# Patient Record
Sex: MALE | Race: ASIAN | HISPANIC OR LATINO | Employment: FULL TIME | URBAN - METROPOLITAN AREA
[De-identification: names, ages, dates, MRNs, and addresses within clinical notes are randomized per-mention and may not be internally consistent; named-entity substitution may affect disease eponyms.]

---

## 2017-04-27 ENCOUNTER — OFFICE VISIT (OUTPATIENT)
Dept: URGENT CARE | Facility: CLINIC | Age: 37
End: 2017-04-27

## 2017-04-27 ENCOUNTER — NON-PROVIDER VISIT (OUTPATIENT)
Dept: URGENT CARE | Facility: CLINIC | Age: 37
End: 2017-04-27

## 2017-04-27 DIAGNOSIS — Z01.89 RESPIRATORY CLEARANCE EXAMINATION, ENCOUNTER FOR: ICD-10-CM

## 2017-04-27 DIAGNOSIS — Z29.89 NEED FOR ISOLATION: ICD-10-CM

## 2017-04-27 PROCEDURE — 94010 BREATHING CAPACITY TEST: CPT | Performed by: PHYSICIAN ASSISTANT

## 2017-04-27 PROCEDURE — 94375 RESPIRATORY FLOW VOLUME LOOP: CPT | Performed by: PHYSICIAN ASSISTANT

## 2017-04-27 NOTE — PROGRESS NOTES
Quinn Rollins is a 36 y.o. male here for a non-provider visit for Mask Fit/ Respiratory Clearance/ Spirometry    If abnormal was an in office provider notified today (if so, indicate provider)? Yes  Routed to PCP? No

## 2017-04-27 NOTE — MR AVS SNAPSHOT
Quinn Rollins   2017 9:15 AM   Appointment   MRN: [id number]    Department:  [facility]   Dept Phone:  [phone]    Description:  Male : 1980   Provider:  [facility]           Allergies as of 2017     No Known Allergies      Vital Signs     Smoking Status                   Current Every Day Smoker           Basic Information     Date Of Birth Sex Race Ethnicity Preferred Language    1980 Male Unknown Unknown English      Problem List              ICD-10-CM Priority Class Noted - Resolved    Health examination of defined subpopulation Z02.89   10/4/2012 - Present      Health Maintenance        Date Due Completion Dates    IMM DTaP/Tdap/Td Vaccine (1 - Tdap) 10/11/1999 ---            Current Immunizations     No immunizations on file.      Below and/or attached are the medications your provider expects you to take. Review all of your home medications and newly ordered medications with your provider and/or pharmacist. Follow medication instructions as directed by your provider and/or pharmacist. Please keep your medication list with you and share with your provider. Update the information when medications are discontinued, doses are changed, or new medications (including over-the-counter products) are added; and carry medication information at all times in the event of emergency situations     Allergies:  No Known Allergies          Medications  Valid as of: 2017 - 11:01 AM    Generic Name Brand Name Tablet Size Instructions for use    .                 Medicines prescribed today were sent to:     None      Medication refill instructions:       If your prescription bottle indicates you have medication refills left, it is not necessary to call your provider’s office. Please contact your pharmacy and they will refill your medication.    If your prescription bottle indicates you do not have any refills left, you may request refills at any time through one of the  following ways: The online Viv[employer]imo system (except Urgent Care), by calling your provider’s office, or by asking your pharmacy to contact your provider’s office with a refill request. Medication refills are processed only during regular business hours and may not be available until the next business day. Your provider may request additional information or to have a follow-up visit with you prior to refilling your medication.   *Please Note: Medication refills are assigned a new Rx number when refilled electronically. Your pharmacy may indicate that no refills were authorized even though a new prescription for the same medication is available at the pharmacy. Please request the medicine by name with the pharmacy before contacting your provider for a refill.           Vivisimo Access Code: [email or web address]  Expires: 5/27/2017 11:01 AM    Vivisimo  A secure, online tool to manage your health information     [employer] Vivisimo® is a secure, online tool that connects you to your personalized health information from the privacy of your home -- day or night - making it very easy for you to manage your healthcare. Once the activation process is completed, you can even access your medical information using the Vivisimo crystal, which is available for free in the Apple Crystal store or Google Play store.     Vivisimo provides the following levels of access (as shown below):   My Chart Features   [facility]  Urgent  Care [facility]  [facility] Care  Doctor   Email your healthcare team securely and privately 24/7 X X X    Manage appointments: schedule your next appointment; view details of past/upcoming appointments X      Request prescription refills. X      View recent personal medical records, including lab and immunizations X X X X   View health record, including health history, allergies, medications X X X X   Read reports about your outpatient visits, procedures, consult and ER notes X X X  X   See your discharge summary, which is a recap of your hospital and/or ER visit that includes your diagnosis, lab results, and care plan. X X       How to register for "[employer]:  1. Go to  [email or web address].  2. Click on the Sign Up Now box, which takes you to the New Member Sign Up page. You will need to provide the following information:  a. Enter your "[employer] Access Code exactly as it appears at the top of this page. (You will not need to use this code after you’ve completed the sign-up process. If you do not sign up before the expiration date, you must request a new code.)   b. Enter your date of birth.   c. Enter your home email address.   d. Click Submit, and follow the next screen’s instructions.  3. Create a [facility] ID. This will be your [facility] login ID and cannot be changed, so think of one that is secure and easy to remember.  4. Create a [facility] password. You can change your password at any time.  5. Enter your Password Reset Question and Answer. This can be used at a later time if you forget your password.   6. Enter your e-mail address. This allows you to receive e-mail notifications when new information is available in "[employer].  7. Click Sign Up. You can now view your health information.    For assistance activating your "[employer] account, call [phone]

## 2018-04-27 ENCOUNTER — NON-PROVIDER VISIT (OUTPATIENT)
Dept: OCCUPATIONAL MEDICINE | Facility: CLINIC | Age: 38
End: 2018-04-27

## 2018-04-27 DIAGNOSIS — Z02.1 PRE-EMPLOYMENT DRUG SCREENING: ICD-10-CM

## 2018-04-27 LAB
AMP AMPHETAMINE: NORMAL
COC COCAINE: NORMAL
INT CON NEG: NORMAL
INT CON POS: NORMAL
MET METHAMPHETAMINES: NORMAL
OPI OPIATES: NORMAL
PCP PHENCYCLIDINE: NORMAL
POC DRUG COMMENT 753798-OCCUPATIONAL HEALTH: NORMAL
THC: NORMAL

## 2018-04-27 PROCEDURE — 80305 DRUG TEST PRSMV DIR OPT OBS: CPT | Performed by: PREVENTIVE MEDICINE

## 2018-10-09 ENCOUNTER — HOSPITAL ENCOUNTER (OUTPATIENT)
Facility: MEDICAL CENTER | Age: 38
End: 2018-10-09
Attending: PHYSICIAN ASSISTANT
Payer: COMMERCIAL

## 2018-10-09 ENCOUNTER — OFFICE VISIT (OUTPATIENT)
Dept: URGENT CARE | Facility: CLINIC | Age: 38
End: 2018-10-09

## 2018-10-09 VITALS
HEIGHT: 66 IN | SYSTOLIC BLOOD PRESSURE: 128 MMHG | BODY MASS INDEX: 30.31 KG/M2 | TEMPERATURE: 98.7 F | OXYGEN SATURATION: 98 % | HEART RATE: 78 BPM | WEIGHT: 188.6 LBS | RESPIRATION RATE: 14 BRPM | DIASTOLIC BLOOD PRESSURE: 86 MMHG

## 2018-10-09 DIAGNOSIS — Z02.89 ENCOUNTER FOR OCCUPATIONAL HEALTH ASSESSMENT: ICD-10-CM

## 2018-10-09 DIAGNOSIS — Z02.1 PRE-EMPLOYMENT DRUG SCREENING: ICD-10-CM

## 2018-10-09 LAB
AMP AMPHETAMINE: NORMAL
APPEARANCE UR: CLEAR
BACTERIA #/AREA URNS HPF: ABNORMAL /HPF
BILIRUB UR QL STRIP.AUTO: NEGATIVE
COC COCAINE: NORMAL
COLOR UR: YELLOW
GLUCOSE UR STRIP.AUTO-MCNC: NEGATIVE MG/DL
INT CON NEG: NORMAL
INT CON POS: NORMAL
KETONES UR STRIP.AUTO-MCNC: NEGATIVE MG/DL
LEUKOCYTE ESTERASE UR QL STRIP.AUTO: NEGATIVE
MET METHAMPHETAMINES: NORMAL
MICRO URNS: NORMAL
NITRITE UR QL STRIP.AUTO: NEGATIVE
OPI OPIATES: NORMAL
PCP PHENCYCLIDINE: NORMAL
PH UR STRIP.AUTO: 6.5 [PH]
POC DRUG COMMENT 753798-OCCUPATIONAL HEALTH: NEGATIVE
PROT UR QL STRIP: NEGATIVE MG/DL
RBC UR QL AUTO: NEGATIVE
SP GR UR STRIP.AUTO: 1.01
THC: NORMAL
UROBILINOGEN UR STRIP.AUTO-MCNC: 0.2 MG/DL

## 2018-10-09 PROCEDURE — 8915 PR COMPREHENSIVE PHYSICAL: Performed by: PHYSICIAN ASSISTANT

## 2018-10-09 PROCEDURE — 80305 DRUG TEST PRSMV DIR OPT OBS: CPT | Performed by: PHYSICIAN ASSISTANT

## 2018-10-09 PROCEDURE — 94010 BREATHING CAPACITY TEST: CPT | Performed by: PHYSICIAN ASSISTANT

## 2018-10-10 LAB
ALBUMIN SERPL BCP-MCNC: 4.3 G/DL (ref 3.2–4.9)
ALBUMIN/GLOB SERPL: 1.3 G/DL
ALP SERPL-CCNC: 68 U/L (ref 30–99)
ALT SERPL-CCNC: 46 U/L (ref 2–50)
ANION GAP SERPL CALC-SCNC: 8 MMOL/L (ref 0–11.9)
AST SERPL-CCNC: 30 U/L (ref 12–45)
BASOPHILS # BLD AUTO: 1.4 % (ref 0–1.8)
BASOPHILS # BLD: 0.09 K/UL (ref 0–0.12)
BILIRUB SERPL-MCNC: 0.4 MG/DL (ref 0.1–1.5)
BUN SERPL-MCNC: 8 MG/DL (ref 8–22)
CALCIUM SERPL-MCNC: 9.4 MG/DL (ref 8.5–10.5)
CHLORIDE SERPL-SCNC: 102 MMOL/L (ref 96–112)
CO2 SERPL-SCNC: 29 MMOL/L (ref 20–33)
CREAT SERPL-MCNC: 1.46 MG/DL (ref 0.5–1.4)
EOSINOPHIL # BLD AUTO: 0.65 K/UL (ref 0–0.51)
EOSINOPHIL NFR BLD: 9.8 % (ref 0–6.9)
ERYTHROCYTE [DISTWIDTH] IN BLOOD BY AUTOMATED COUNT: 43.2 FL (ref 35.9–50)
GLOBULIN SER CALC-MCNC: 3.2 G/DL (ref 1.9–3.5)
GLUCOSE SERPL-MCNC: 81 MG/DL (ref 65–99)
HCT VFR BLD AUTO: 56 % (ref 42–52)
HGB BLD-MCNC: 18.5 G/DL (ref 14–18)
IMM GRANULOCYTES # BLD AUTO: 0.02 K/UL (ref 0–0.11)
IMM GRANULOCYTES NFR BLD AUTO: 0.3 % (ref 0–0.9)
LYMPHOCYTES # BLD AUTO: 2.13 K/UL (ref 1–4.8)
LYMPHOCYTES NFR BLD: 32 % (ref 22–41)
MCH RBC QN AUTO: 31.3 PG (ref 27–33)
MCHC RBC AUTO-ENTMCNC: 33 G/DL (ref 33.7–35.3)
MCV RBC AUTO: 94.6 FL (ref 81.4–97.8)
MONOCYTES # BLD AUTO: 0.45 K/UL (ref 0–0.85)
MONOCYTES NFR BLD AUTO: 6.8 % (ref 0–13.4)
NEUTROPHILS # BLD AUTO: 3.32 K/UL (ref 1.82–7.42)
NEUTROPHILS NFR BLD: 49.7 % (ref 44–72)
NRBC # BLD AUTO: 0 K/UL
NRBC BLD-RTO: 0 /100 WBC
PLATELET # BLD AUTO: 215 K/UL (ref 164–446)
PMV BLD AUTO: 9.6 FL (ref 9–12.9)
POTASSIUM SERPL-SCNC: 4.2 MMOL/L (ref 3.6–5.5)
PROT SERPL-MCNC: 7.5 G/DL (ref 6–8.2)
RBC # BLD AUTO: 5.92 M/UL (ref 4.7–6.1)
SODIUM SERPL-SCNC: 139 MMOL/L (ref 135–145)
WBC # BLD AUTO: 6.7 K/UL (ref 4.8–10.8)

## 2018-10-13 LAB
LEAD BLD-MCNC: <2 UG/DL (ref 0–4.9)
ZPP RBC-MCNC: 28 UG/DL (ref 0–40)
ZPP RBC-SRTO: 48 UMOLZPP/MOLHEM (ref 0–69)

## 2020-10-30 ENCOUNTER — OFFICE VISIT (OUTPATIENT)
Dept: URGENT CARE | Facility: PHYSICIAN GROUP | Age: 40
End: 2020-10-30
Payer: COMMERCIAL

## 2020-10-30 ENCOUNTER — HOSPITAL ENCOUNTER (OUTPATIENT)
Facility: MEDICAL CENTER | Age: 40
End: 2020-10-30
Attending: FAMILY MEDICINE
Payer: COMMERCIAL

## 2020-10-30 VITALS
HEART RATE: 81 BPM | WEIGHT: 183 LBS | OXYGEN SATURATION: 98 % | BODY MASS INDEX: 29.41 KG/M2 | TEMPERATURE: 98.2 F | RESPIRATION RATE: 15 BRPM | SYSTOLIC BLOOD PRESSURE: 122 MMHG | DIASTOLIC BLOOD PRESSURE: 82 MMHG | HEIGHT: 66 IN

## 2020-10-30 DIAGNOSIS — Z20.822 CLOSE EXPOSURE TO COVID-19 VIRUS: ICD-10-CM

## 2020-10-30 DIAGNOSIS — G44.209 TENSION-TYPE HEADACHE, NOT INTRACTABLE, UNSPECIFIED CHRONICITY PATTERN: ICD-10-CM

## 2020-10-30 LAB — COVID ORDER STATUS COVID19: NORMAL

## 2020-10-30 PROCEDURE — U0003 INFECTIOUS AGENT DETECTION BY NUCLEIC ACID (DNA OR RNA); SEVERE ACUTE RESPIRATORY SYNDROME CORONAVIRUS 2 (SARS-COV-2) (CORONAVIRUS DISEASE [COVID-19]), AMPLIFIED PROBE TECHNIQUE, MAKING USE OF HIGH THROUGHPUT TECHNOLOGIES AS DESCRIBED BY CMS-2020-01-R: HCPCS

## 2020-10-30 PROCEDURE — 99214 OFFICE O/P EST MOD 30 MIN: CPT | Performed by: FAMILY MEDICINE

## 2020-10-30 RX ORDER — IBUPROFEN 800 MG/1
800 TABLET ORAL EVERY 8 HOURS PRN
Qty: 30 TAB | Refills: 0 | Status: SHIPPED | OUTPATIENT
Start: 2020-10-30 | End: 2023-06-29

## 2020-10-30 RX ORDER — ACETAMINOPHEN 325 MG/1
650 TABLET ORAL EVERY 4 HOURS PRN
COMMUNITY

## 2020-10-30 NOTE — PROGRESS NOTES
"Chief Complaint   Patient presents with   • Headache     COVID exposure         headache  This is a new problem. The current episode started in the past 2 days. The problem occurs intermittently. The problem has been gradually improved.   C/o dull, achy pain over forehead.  The pain is mild.     Nothing seems to make the headache worse.     Patient has tried acetaminophen for the symptoms. The treatment provided no relief.          **POSITIVE EXPOSURE TO COVID AT WORK    Social History     Tobacco Use   • Smoking status: Current Every Day Smoker     Packs/day: 1.00     Years: 5.00     Pack years: 5.00     Types: Cigarettes   • Smokeless tobacco: Never Used   Substance Use Topics   • Alcohol use: Not on file   • Drug use: No            Past medical history was unremarkable and not pertinent to current issue      Review of Systems   Constitutional: Negative for fever, chills and malaise/fatigue.   Eyes: Negative for vision changes, d/c.   negative for photophobia.   Respiratory: Negative for cough and sputum production.    Cardiovascular: Negative for chest pain and palpitations.   Gastrointestinal: Negative for nausea, vomiting, abdominal pain, diarrhea and constipation.   Genitourinary: Negative for dysuria, urgency and frequency.   Skin: Negative for rash or  itching.   Neurological: Negative for dizziness and tingling.   Psychiatric/Behavioral: Negative for depression.   Hematologic/lymphatic - denies bruising or excessive bleeding  All other systems reviewed and are negative.               Objective:     /82   Pulse 81   Temp 36.8 °C (98.2 °F)   Resp 15   Ht 1.676 m (5' 6\")   Wt 83 kg (183 lb)   SpO2 98%     Physical Exam   Constitutional: pt is oriented to person, place, and time.  Pt appears well-developed and well-nourished. No distress.   HENT:   Head: Normocephalic and atraumatic.   Mouth/Throat: Oropharynx is clear and moist. No oropharyngeal exudate.   Eyes: Conjunctivae and EOM are normal. " Pupils are equal, round, and reactive to light. Right eye exhibits no discharge. Left eye exhibits no discharge. No scleral icterus.   Neck: Neck supple.   Cardiovascular: Normal rate and regular rhythm.    Pulmonary/Chest: Effort normal.  CTAB  Lymphadenopathy:     Pt has no cervical adenopathy.   Neurologic: Alert and oriented. Cranial nerves II-XII intact, EOMs intact, no tongue deviation, PERRL, no facial asymmetry to motor or sensation, symmetric palate, normal finger-to-nose test, no pronator drift. No focal motor deficits. Symmetric reflexes. Normal station and gait, normal tandem walk. Coordination normal.   Skin: Skin is warm. Pt is not diaphoretic. No erythema. No pallor.   Psychiatric:  behavior is normal.   Nursing note and vitals reviewed.              Assessment/Plan:        1. Close exposure to COVID-19 virus     Will send screen for COVID  Home isolation for now       - COVID/SARS COV-2 PCR; Future    2. Tension-type headache, not intractable, unspecified chronicity pattern     - ibuprofen (MOTRIN) 800 MG Tab; Take 1 Tab by mouth every 8 hours as needed.  Dispense: 30 Tab; Refill: 0

## 2020-10-31 LAB
SARS-COV-2 RNA RESP QL NAA+PROBE: NOTDETECTED
SPECIMEN SOURCE: NORMAL

## 2020-11-02 ENCOUNTER — TELEPHONE (OUTPATIENT)
Dept: URGENT CARE | Facility: CLINIC | Age: 40
End: 2020-11-02

## 2022-05-21 ENCOUNTER — OFFICE VISIT (OUTPATIENT)
Dept: URGENT CARE | Facility: PHYSICIAN GROUP | Age: 42
End: 2022-05-21
Payer: COMMERCIAL

## 2022-05-21 VITALS
DIASTOLIC BLOOD PRESSURE: 84 MMHG | WEIGHT: 185 LBS | TEMPERATURE: 97 F | BODY MASS INDEX: 29.73 KG/M2 | HEIGHT: 66 IN | OXYGEN SATURATION: 98 % | SYSTOLIC BLOOD PRESSURE: 122 MMHG | HEART RATE: 78 BPM

## 2022-05-21 DIAGNOSIS — H10.33 ACUTE CONJUNCTIVITIS OF BOTH EYES, UNSPECIFIED ACUTE CONJUNCTIVITIS TYPE: ICD-10-CM

## 2022-05-21 PROCEDURE — 99213 OFFICE O/P EST LOW 20 MIN: CPT | Performed by: STUDENT IN AN ORGANIZED HEALTH CARE EDUCATION/TRAINING PROGRAM

## 2022-05-21 RX ORDER — FLUTICASONE PROPIONATE 50 MCG
2 SPRAY, SUSPENSION (ML) NASAL
Qty: 16 G | Refills: 1 | Status: SHIPPED | OUTPATIENT
Start: 2022-05-21

## 2022-05-21 RX ORDER — CETIRIZINE HYDROCHLORIDE 10 MG/1
10 TABLET ORAL DAILY
Qty: 30 TABLET | Refills: 1 | Status: SHIPPED | OUTPATIENT
Start: 2022-05-21

## 2022-05-21 RX ORDER — OLOPATADINE HYDROCHLORIDE 1 MG/ML
1 SOLUTION/ DROPS OPHTHALMIC 2 TIMES DAILY
Qty: 5 ML | Refills: 0 | Status: SHIPPED | OUTPATIENT
Start: 2022-05-21

## 2022-05-21 NOTE — LETTER
May 21, 2022       Patient: Quinn Rollins   YOB: 1980   Date of Visit: 5/21/2022         To Whom It May Concern:    Quinn Rollins is cleared to return to work. He does not have a bacterial infection of his eye.     If you have any questions or concerns, please don't hesitate to call [phone]          Sincerely,          Fernando Larry D.O.  Electronically Signed

## 2022-05-21 NOTE — PROGRESS NOTES
"Subjective:   CHIEF COMPLAINT  Chief Complaint   Patient presents with   • Eye Problem     X this am, Rt. Eye redness, itchy and crusty       [facility]  Quinn Rollins is a 41 y.o. male who presents with a chief complaint of right eye redness and discharge.  Says it woke him up around 130 this morning.  Eye is slightly pruritic.  He is not experiencing any eye pain.  He has not tried taking medications.  No specific modifying factors.  He does not wear contacts.  No changes in vision.  He has a daughter at home with similar symptoms.    REVIEW OF SYSTEMS  General: no fever or chills  GI: no nausea or vomiting  See HPI for further details.    PAST MEDICAL HISTORY  Patient Active Problem List    Diagnosis Date Noted   • Health examination of defined subpopulation 10/04/2012       SURGICAL HISTORY  patient denies any surgical history    ALLERGIES  No Known Allergies    CURRENT MEDICATIONS  Home Medications    **Home medications have not yet been reviewed for this encounter**         SOCIAL HISTORY  Social History     Tobacco Use   • Smoking status: Current Every Day Smoker     Packs/day: 1.00     Years: 5.00     Pack years: 5.00     Types: Cigarettes   • Smokeless tobacco: Never Used   Substance and Sexual Activity   • Alcohol use: Not on file   • Drug use: No   • Sexual activity: Not Currently       FAMILY HISTORY  No family history on file.       Objective:   PHYSICAL EXAM  VITAL SIGNS: /84 (BP Location: Left arm, Patient Position: Sitting, BP Cuff Size: Large adult)   Pulse 78   Temp 36.1 °C (97 °F) (Temporal)   Ht 1.676 m (5' 6\")   Wt 83.9 kg (185 lb)   SpO2 98%   BMI 29.86 kg/m²     Gen: no acute distress, normal voice  Skin: dry, intact, moist mucosal membranes  Eye: EOMI and PERRLA b/l.  Mild right conjunctival injection.  No chemosis.  No pain with extraocular eye movement or swinging flashlight.  Lungs: CTAB w/ symmetric expansion  CV: RRR w/o murmurs or clicks  Psych: normal affect, normal " judgement, alert, awake    Assessment/Plan:     1. Acute conjunctivitis of both eyes, unspecified acute conjunctivitis type  olopatadine (PATANOL) 0.1 % ophthalmic solution    fluticasone (FLONASE) 50 MCG/ACT nasal spray    cetirizine (ZYRTEC) 10 MG Tab   Likely viral given her daughter is experiencing similar symptoms.  Signs and symptoms are not consistent with a bacterial conjunctivitis.  - Ordered Rx for Patanol  - Ordered Rx for Flonase  - Ordered Rx for Zyrtec  - Provided note for work  - Return to urgent care any new/worsening symptoms or further questions or concerns.  Patient understood everything discussed.  All questions were answered.      Differential diagnosis, natural history, supportive care, and indications for immediate follow-up discussed. All questions answered. Patient agrees with the plan of care.    Follow-up as needed if symptoms worsen or fail to improve to PCP, Urgent care or Emergency Room.    Please note that this dictation was created using voice recognition software. I have made a reasonable attempt to correct obvious errors, but I expect that there are errors of grammar and possibly content that I did not discover before finalizing the note.

## 2022-07-18 ENCOUNTER — TELEPHONE (OUTPATIENT)
Dept: SCHEDULING | Facility: IMAGING CENTER | Age: 42
End: 2022-07-18

## 2022-07-29 SDOH — ECONOMIC STABILITY: HOUSING INSECURITY: IN THE LAST 12 MONTHS, HOW MANY PLACES HAVE YOU LIVED?: 1

## 2022-07-29 SDOH — ECONOMIC STABILITY: TRANSPORTATION INSECURITY
IN THE PAST 12 MONTHS, HAS LACK OF RELIABLE TRANSPORTATION KEPT YOU FROM MEDICAL APPOINTMENTS, MEETINGS, WORK OR FROM GETTING THINGS NEEDED FOR DAILY LIVING?: NO

## 2022-07-29 SDOH — ECONOMIC STABILITY: FOOD INSECURITY: WITHIN THE PAST 12 MONTHS, THE FOOD YOU BOUGHT JUST DIDN'T LAST AND YOU DIDN'T HAVE MONEY TO GET MORE.: NEVER TRUE

## 2022-07-29 SDOH — ECONOMIC STABILITY: HOUSING INSECURITY
IN THE LAST 12 MONTHS, WAS THERE A TIME WHEN YOU DID NOT HAVE A STEADY PLACE TO SLEEP OR SLEPT IN A SHELTER (INCLUDING NOW)?: NO

## 2022-07-29 SDOH — ECONOMIC STABILITY: INCOME INSECURITY: IN THE LAST 12 MONTHS, WAS THERE A TIME WHEN YOU WERE NOT ABLE TO PAY THE MORTGAGE OR RENT ON TIME?: YES

## 2022-07-29 SDOH — HEALTH STABILITY: MENTAL HEALTH
STRESS IS WHEN SOMEONE FEELS TENSE, NERVOUS, ANXIOUS, OR CAN'T SLEEP AT NIGHT BECAUSE THEIR MIND IS TROUBLED. HOW STRESSED ARE YOU?: RATHER MUCH

## 2022-07-29 SDOH — HEALTH STABILITY: PHYSICAL HEALTH: ON AVERAGE, HOW MANY DAYS PER WEEK DO YOU ENGAGE IN MODERATE TO STRENUOUS EXERCISE (LIKE A BRISK WALK)?: 3 DAYS

## 2022-07-29 SDOH — ECONOMIC STABILITY: TRANSPORTATION INSECURITY
IN THE PAST 12 MONTHS, HAS THE LACK OF TRANSPORTATION KEPT YOU FROM MEDICAL APPOINTMENTS OR FROM GETTING MEDICATIONS?: NO

## 2022-07-29 SDOH — ECONOMIC STABILITY: FOOD INSECURITY: WITHIN THE PAST 12 MONTHS, YOU WORRIED THAT YOUR FOOD WOULD RUN OUT BEFORE YOU GOT MONEY TO BUY MORE.: NEVER TRUE

## 2022-07-29 SDOH — ECONOMIC STABILITY: HOUSING INSECURITY
IN THE LAST 12 MONTHS, WAS THERE A TIME WHEN YOU DID NOT HAVE A STEADY PLACE TO SLEEP OR SLEPT IN A SHELTER (INCLUDING NOW)?: YES

## 2022-07-29 SDOH — HEALTH STABILITY: PHYSICAL HEALTH: ON AVERAGE, HOW MANY MINUTES DO YOU ENGAGE IN EXERCISE AT THIS LEVEL?: 60 MIN

## 2022-07-29 SDOH — ECONOMIC STABILITY: TRANSPORTATION INSECURITY
IN THE PAST 12 MONTHS, HAS LACK OF TRANSPORTATION KEPT YOU FROM MEETINGS, WORK, OR FROM GETTING THINGS NEEDED FOR DAILY LIVING?: NO

## 2022-07-29 SDOH — ECONOMIC STABILITY: INCOME INSECURITY: HOW HARD IS IT FOR YOU TO PAY FOR THE VERY BASICS LIKE FOOD, HOUSING, MEDICAL CARE, AND HEATING?: NOT HARD AT ALL

## 2022-07-29 ASSESSMENT — LIFESTYLE VARIABLES
HOW OFTEN DO YOU HAVE A DRINK CONTAINING ALCOHOL: MONTHLY OR LESS
HOW OFTEN DO YOU HAVE SIX OR MORE DRINKS ON ONE OCCASION: LESS THAN MONTHLY

## 2022-07-29 ASSESSMENT — SOCIAL DETERMINANTS OF HEALTH (SDOH)
HOW OFTEN DO YOU GET TOGETHER WITH FRIENDS OR RELATIVES?: ONCE A WEEK
HOW OFTEN DO YOU ATTEND CHURCH OR RELIGIOUS SERVICES?: 1 TO 4 TIMES PER YEAR
WITHIN THE PAST 12 MONTHS, YOU WORRIED THAT YOUR FOOD WOULD RUN OUT BEFORE YOU GOT THE MONEY TO BUY MORE: NEVER TRUE
HOW OFTEN DO YOU ATTENT MEETINGS OF THE CLUB OR ORGANIZATION YOU BELONG TO?: NEVER
HOW OFTEN DO YOU HAVE SIX OR MORE DRINKS ON ONE OCCASION: LESS THAN MONTHLY
HOW OFTEN DO YOU ATTENT MEETINGS OF THE CLUB OR ORGANIZATION YOU BELONG TO?: NEVER
IN A TYPICAL WEEK, HOW MANY TIMES DO YOU TALK ON THE PHONE WITH FAMILY, FRIENDS, OR NEIGHBORS?: MORE THAN THREE TIMES A WEEK
IN A TYPICAL WEEK, HOW MANY TIMES DO YOU TALK ON THE PHONE WITH FAMILY, FRIENDS, OR NEIGHBORS?: MORE THAN THREE TIMES A WEEK
DO YOU BELONG TO ANY CLUBS OR ORGANIZATIONS SUCH AS CHURCH GROUPS UNIONS, FRATERNAL OR ATHLETIC GROUPS, OR SCHOOL GROUPS?: NO
HOW OFTEN DO YOU HAVE A DRINK CONTAINING ALCOHOL: MONTHLY OR LESS
HOW OFTEN DO YOU GET TOGETHER WITH FRIENDS OR RELATIVES?: ONCE A WEEK
HOW HARD IS IT FOR YOU TO PAY FOR THE VERY BASICS LIKE FOOD, HOUSING, MEDICAL CARE, AND HEATING?: NOT HARD AT ALL
DO YOU BELONG TO ANY CLUBS OR ORGANIZATIONS SUCH AS CHURCH GROUPS UNIONS, FRATERNAL OR ATHLETIC GROUPS, OR SCHOOL GROUPS?: NO
HOW OFTEN DO YOU ATTEND CHURCH OR RELIGIOUS SERVICES?: 1 TO 4 TIMES PER YEAR

## 2022-08-01 ENCOUNTER — OFFICE VISIT (OUTPATIENT)
Dept: MEDICAL GROUP | Facility: MEDICAL CENTER | Age: 42
End: 2022-08-01
Payer: COMMERCIAL

## 2022-08-01 ENCOUNTER — HOSPITAL ENCOUNTER (OUTPATIENT)
Dept: LAB | Facility: MEDICAL CENTER | Age: 42
End: 2022-08-01
Attending: FAMILY MEDICINE
Payer: COMMERCIAL

## 2022-08-01 VITALS
TEMPERATURE: 97.5 F | SYSTOLIC BLOOD PRESSURE: 118 MMHG | DIASTOLIC BLOOD PRESSURE: 78 MMHG | WEIGHT: 193.6 LBS | OXYGEN SATURATION: 98 % | BODY MASS INDEX: 31.12 KG/M2 | HEIGHT: 66 IN | HEART RATE: 94 BPM

## 2022-08-01 DIAGNOSIS — R51.9 NONINTRACTABLE HEADACHE, UNSPECIFIED CHRONICITY PATTERN, UNSPECIFIED HEADACHE TYPE: ICD-10-CM

## 2022-08-01 DIAGNOSIS — R53.83 FATIGUE, UNSPECIFIED TYPE: ICD-10-CM

## 2022-08-01 DIAGNOSIS — E66.9 OBESITY (BMI 30.0-34.9): ICD-10-CM

## 2022-08-01 LAB
ALBUMIN SERPL BCP-MCNC: 4.5 G/DL (ref 3.2–4.9)
ALBUMIN/GLOB SERPL: 1.5 G/DL
ALP SERPL-CCNC: 73 U/L (ref 30–99)
ALT SERPL-CCNC: 31 U/L (ref 2–50)
ANION GAP SERPL CALC-SCNC: 11 MMOL/L (ref 7–16)
AST SERPL-CCNC: 24 U/L (ref 12–45)
BILIRUB SERPL-MCNC: 0.4 MG/DL (ref 0.1–1.5)
BUN SERPL-MCNC: 11 MG/DL (ref 8–22)
CALCIUM SERPL-MCNC: 9.2 MG/DL (ref 8.5–10.5)
CHLORIDE SERPL-SCNC: 105 MMOL/L (ref 96–112)
CHOLEST SERPL-MCNC: 182 MG/DL (ref 100–199)
CO2 SERPL-SCNC: 25 MMOL/L (ref 20–33)
CREAT SERPL-MCNC: 1.39 MG/DL (ref 0.5–1.4)
ERYTHROCYTE [DISTWIDTH] IN BLOOD BY AUTOMATED COUNT: 44.1 FL (ref 35.9–50)
ERYTHROCYTE [SEDIMENTATION RATE] IN BLOOD BY WESTERGREN METHOD: 4 MM/HOUR (ref 0–20)
GFR SERPLBLD CREATININE-BSD FMLA CKD-EPI: 65 ML/MIN/1.73 M 2
GLOBULIN SER CALC-MCNC: 3.1 G/DL (ref 1.9–3.5)
GLUCOSE SERPL-MCNC: 93 MG/DL (ref 65–99)
HCT VFR BLD AUTO: 56.2 % (ref 42–52)
HDLC SERPL-MCNC: 41 MG/DL
HGB BLD-MCNC: 19 G/DL (ref 14–18)
LDLC SERPL CALC-MCNC: 118 MG/DL
MCH RBC QN AUTO: 31.6 PG (ref 27–33)
MCHC RBC AUTO-ENTMCNC: 33.8 G/DL (ref 33.7–35.3)
MCV RBC AUTO: 93.5 FL (ref 81.4–97.8)
PLATELET # BLD AUTO: 368 K/UL (ref 164–446)
PMV BLD AUTO: 9 FL (ref 9–12.9)
POTASSIUM SERPL-SCNC: 4.7 MMOL/L (ref 3.6–5.5)
PROT SERPL-MCNC: 7.6 G/DL (ref 6–8.2)
RBC # BLD AUTO: 6.01 M/UL (ref 4.7–6.1)
SODIUM SERPL-SCNC: 141 MMOL/L (ref 135–145)
T4 FREE SERPL-MCNC: 1.48 NG/DL (ref 0.93–1.7)
TRIGL SERPL-MCNC: 115 MG/DL (ref 0–149)
TSH SERPL DL<=0.005 MIU/L-ACNC: 0.25 UIU/ML (ref 0.38–5.33)
WBC # BLD AUTO: 5.5 K/UL (ref 4.8–10.8)

## 2022-08-01 PROCEDURE — 84403 ASSAY OF TOTAL TESTOSTERONE: CPT

## 2022-08-01 PROCEDURE — 36415 COLL VENOUS BLD VENIPUNCTURE: CPT

## 2022-08-01 PROCEDURE — 84270 ASSAY OF SEX HORMONE GLOBUL: CPT

## 2022-08-01 PROCEDURE — 85027 COMPLETE CBC AUTOMATED: CPT

## 2022-08-01 PROCEDURE — 99213 OFFICE O/P EST LOW 20 MIN: CPT | Performed by: FAMILY MEDICINE

## 2022-08-01 PROCEDURE — 85652 RBC SED RATE AUTOMATED: CPT

## 2022-08-01 PROCEDURE — 84402 ASSAY OF FREE TESTOSTERONE: CPT

## 2022-08-01 PROCEDURE — 84443 ASSAY THYROID STIM HORMONE: CPT

## 2022-08-01 PROCEDURE — 80053 COMPREHEN METABOLIC PANEL: CPT

## 2022-08-01 PROCEDURE — 84439 ASSAY OF FREE THYROXINE: CPT

## 2022-08-01 PROCEDURE — 80061 LIPID PANEL: CPT

## 2022-08-01 ASSESSMENT — PATIENT HEALTH QUESTIONNAIRE - PHQ9: CLINICAL INTERPRETATION OF PHQ2 SCORE: 0

## 2022-08-01 NOTE — PROGRESS NOTES
"Subjective:     CC:  Diagnoses of Nonintractable headache, unspecified chronicity pattern, unspecified headache type, Fatigue, unspecified type, and Obesity (BMI 30.0-34.9) were pertinent to this visit.    HISTORY OF THE PRESENT ILLNESS: Patient is a 41 y.o. male. This pleasant patient is here today to establish care and discuss feeling tired, headaches, smoking.     Smoking cessation  He has a significant smoking history however he has stopped cigarettes a few months ago and is using a vape pen with known nicotine cartridges at times to help give her this habit.    2-3 weeks ago  Nausea and vomiting for 1 day  Headaches since  No fevers, chills  Does have history of sinus headaches  Its new to have headache to last this long  No congestion, visual changes  No sore throat  Does have back pain in thoracic area  Tylenol and Migraine motrin does seem to help     Notes fatigue and sluggishness recently  Has not had motivation to work out like he use to    No problems updated.    Current Outpatient Medications Ordered in Epic   Medication Sig Dispense Refill   • olopatadine (PATANOL) 0.1 % ophthalmic solution Administer 1 Drop into the right eye 2 times a day. 5 mL 0   • fluticasone (FLONASE) 50 MCG/ACT nasal spray Administer 2 Sprays into affected nostril(S) at bedtime. 16 g 1   • cetirizine (ZYRTEC) 10 MG Tab Take 1 Tablet by mouth every day. 30 Tablet 1   • acetaminophen (TYLENOL) 325 MG Tab Take 650 mg by mouth every four hours as needed.     • ibuprofen (MOTRIN) 800 MG Tab Take 1 Tab by mouth every 8 hours as needed. 30 Tab 0     No current Epic-ordered facility-administered medications on file.       Health Maintenance: Counseled on Tdap    ROS:   ROS see HPI      Objective:       Exam: /78   Pulse 94   Temp 36.4 °C (97.5 °F) (Temporal)   Ht 1.676 m (5' 6\")   Wt 87.8 kg (193 lb 9.6 oz)   SpO2 98%  Body mass index is 31.25 kg/m².    Physical Exam  Vitals reviewed.   Constitutional:       General: He is " not in acute distress.     Appearance: Normal appearance.   HENT:      Head: Normocephalic and atraumatic.      Ears:      Comments: TMs appear mildly bulging without erythema and apparent clear fluid behind tympanic membrane  Cardiovascular:      Rate and Rhythm: Normal rate and regular rhythm.      Heart sounds: Normal heart sounds.   Pulmonary:      Effort: Pulmonary effort is normal.      Breath sounds: Normal breath sounds.   Neurological:      Mental Status: He is alert. Mental status is at baseline.      Cranial Nerves: No cranial nerve deficit.      Coordination: Coordination normal.      Gait: Gait normal.   Psychiatric:         Mood and Affect: Mood normal.         Behavior: Behavior normal.           Assessment & Plan:   41 y.o. male with the following -    Problem List Items Addressed This Visit    None     Visit Diagnoses     Nonintractable headache, unspecified chronicity pattern, unspecified headache type      Patient with no red flag symptoms and no concerning findings on physical exam.  Headache does respond to Tylenol and Motrin.    Here are the supplements I recommend for migraine prophylaxis:  - Riboflavin 400 mg daily  - CoQ-10 100 mg twice daily  - Magnesium citrate 600 mg daily     Given how ears look possible congestion from allergies is causing some of the symptoms and we will try following allergy treatment  Allergy treatment:  Daily allergy medicine (example: Claritin, Zyrtec, Allegra)  Nightly Steroid nose spray (example: Fluticasone, Flonase)    Relevant Orders    CBC WITHOUT DIFFERENTIAL    TSH WITH REFLEX TO FT4    Sed Rate    Fatigue, unspecified type        Relevant Orders    CBC WITHOUT DIFFERENTIAL    Comp Metabolic Panel    TSH WITH REFLEX TO FT4    Testosterone, Free & Total, Adult Male (w/SHBG)    Obesity (BMI 30.0-34.9)      Patient noted to have high muscle mass    Relevant Orders    Comp Metabolic Panel    Lipid Profile            Return in about 4 weeks (around 8/29/2022),  or if symptoms worsen or fail to improve, for Lab F/U, headache follow up.    Please note that this dictation was created using voice recognition software. I have made every reasonable attempt to correct obvious errors, but I expect that there are errors of grammar and possibly content that I did not discover before finalizing the note.

## 2022-08-09 LAB
SHBG SERPL-SCNC: 24 NMOL/L (ref 17–56)
TESTOST FREE MFR SERPL: 2 % (ref 1.6–2.9)
TESTOST FREE SERPL-MCNC: 31 PG/ML (ref 47–244)
TESTOST SERPL-MCNC: 156 NG/DL (ref 300–890)

## 2022-08-23 ENCOUNTER — OFFICE VISIT (OUTPATIENT)
Dept: MEDICAL GROUP | Facility: MEDICAL CENTER | Age: 42
End: 2022-08-23
Payer: COMMERCIAL

## 2022-08-23 VITALS
HEART RATE: 89 BPM | BODY MASS INDEX: 30.76 KG/M2 | WEIGHT: 190.6 LBS | DIASTOLIC BLOOD PRESSURE: 72 MMHG | SYSTOLIC BLOOD PRESSURE: 124 MMHG | TEMPERATURE: 97.4 F | OXYGEN SATURATION: 99 %

## 2022-08-23 DIAGNOSIS — D75.1 POLYCYTHEMIA: ICD-10-CM

## 2022-08-23 DIAGNOSIS — E29.1 HYPOGONADISM IN MALE: ICD-10-CM

## 2022-08-23 DIAGNOSIS — E78.00 ELEVATED LDL CHOLESTEROL LEVEL: ICD-10-CM

## 2022-08-23 PROCEDURE — 99214 OFFICE O/P EST MOD 30 MIN: CPT | Performed by: FAMILY MEDICINE

## 2022-08-23 ASSESSMENT — FIBROSIS 4 INDEX: FIB4 SCORE: 0.48

## 2022-08-23 NOTE — ASSESSMENT & PLAN NOTE
Provided patient with information about cholesterol content in food, cholesterol does.  He is going to try to improve his dietary habits and get back into working out as he can.  We will recheck in 6 months and see if there is some good changes.

## 2022-08-23 NOTE — PROGRESS NOTES
"Subjective:     CC: \"Follow-up on labs\"    HPI:   Quinn presents today with     Patient does still have continued mild headaches  He has been using ibuprofen for this which is effective  He has noted his vision is worsening and has an appointment for his eye doctor later this month thinks he may need glasses  ESR was well within normal limits  No weakness, paralysis, confusion    Patient had elevated LDL on his recent labs  He has not been working out over the last 7 months with his promotion and is noted a change in his body structure    Patient noted to have decreased total and free testosterone  He would like to explore supplementation for this as he feels fatigue, decreased motivation    Patient has chronic elevated hemoglobin and hematocrit  He admits he does not sleep enough but does not know of any apnea type symptoms    Problem   Hypogonadism in Male             Elevated Ldl Cholesterol Level         Polycythemia             Current Outpatient Medications Ordered in Epic   Medication Sig Dispense Refill    olopatadine (PATANOL) 0.1 % ophthalmic solution Administer 1 Drop into the right eye 2 times a day. 5 mL 0    fluticasone (FLONASE) 50 MCG/ACT nasal spray Administer 2 Sprays into affected nostril(S) at bedtime. 16 g 1    cetirizine (ZYRTEC) 10 MG Tab Take 1 Tablet by mouth every day. 30 Tablet 1    acetaminophen (TYLENOL) 325 MG Tab Take 650 mg by mouth every four hours as needed.      ibuprofen (MOTRIN) 800 MG Tab Take 1 Tab by mouth every 8 hours as needed. 30 Tab 0     No current Epic-ordered facility-administered medications on file.       Health Maintenance: Discussed but declined hepatitis B vaccine at this time    ROS:  ROS see HPI    Objective:     Exam:  /72   Pulse 89   Temp 36.3 °C (97.4 °F) (Temporal)   Ht (P) 1.676 m (5' 6\")   Wt 86.5 kg (190 lb 9.6 oz)   SpO2 99%   BMI (P) 30.76 kg/m²  Body mass index is 30.76 kg/m² (pended).    Physical Exam  Vitals reviewed.   Constitutional:  "      General: He is not in acute distress.     Appearance: Normal appearance.   HENT:      Head: Normocephalic and atraumatic.   Cardiovascular:      Rate and Rhythm: Normal rate and regular rhythm.      Heart sounds: Normal heart sounds.   Pulmonary:      Effort: Pulmonary effort is normal.      Breath sounds: Normal breath sounds.   Skin:     General: Skin is warm and dry.   Neurological:      Mental Status: He is alert. Mental status is at baseline.   Psychiatric:         Mood and Affect: Mood normal.         Behavior: Behavior normal.     STOPBANG - Sleep Apnea Screening      Flowsheet Row Most Recent Value   S - Have you been told that you SNORE? Yes   T - Are you often TIRED during the day? No   O - Do you know if you stop breathing or has anyone witnessed you stop breathing while you were asleep? (OBSTRUCTION) No   P - Do you have high blood PRESSURE or on medication to control high blood pressure? No   B - Is your Body Mass Index greater than 35? (BMI) No   A - Are you 50 years old or older? (AGE) No   N - Are you a male with a NECK circumference greater than 17 inches, or a female with a neck circumference greater than 16 inches? No   G - Are you male? (GENDER) Yes   STOPBANG Total Score 2   KRAIG Risk Low Risk              Assessment & Plan:     41 y.o. male with the following -     Problem List Items Addressed This Visit       Hypogonadism in male     Total and free testosterone range.  Patient is having some symptoms suggestive that testosterone could be at play.  Also discussed lifestyle with patient as he is not getting enough total sleep and this may also be driving the symptoms and his altered labs.  Will refer to urology for further evaluation management of hypogonadism.         Relevant Orders    Referral to Urology    Comp Metabolic Panel    Elevated LDL cholesterol level     Provided patient with information about cholesterol content in food, cholesterol does.  He is going to try to improve his  dietary habits and get back into working out as he can.  We will recheck in 6 months and see if there is some good changes.         Relevant Orders    Comp Metabolic Panel    Lipid Profile    Polycythemia     Discussed with patient that this could worsen if he were to supplement with testosterone.  We will keep an eye on this and get more of a trend in repeat labs in 6 months.  Patient may consider donating blood.  Negative screening for sleep apnea.         Relevant Orders    CBC WITHOUT DIFFERENTIAL       I spent a total of 32 minutes with record review, exam, communication with the patient, communication with other providers, and documentation of this encounter.      Return in about 1 year (around 8/23/2023), or if symptoms worsen or fail to improve, for Annual Visit.    Please note that this dictation was created using voice recognition software. I have made every reasonable attempt to correct obvious errors, but I expect that there are errors of grammar and possibly content that I did not discover before finalizing the note.

## 2022-08-23 NOTE — ASSESSMENT & PLAN NOTE
Discussed with patient that this could worsen if he were to supplement with testosterone.  We will keep an eye on this and get more of a trend in repeat labs in 6 months.  Patient may consider donating blood.  Negative screening for sleep apnea.

## 2022-08-23 NOTE — ASSESSMENT & PLAN NOTE
Total and free testosterone range.  Patient is having some symptoms suggestive that testosterone could be at play.  Also discussed lifestyle with patient as he is not getting enough total sleep and this may also be driving the symptoms and his altered labs.  Will refer to urology for further evaluation management of hypogonadism.

## 2022-09-09 ENCOUNTER — APPOINTMENT (OUTPATIENT)
Dept: LAB | Facility: MEDICAL CENTER | Age: 42
End: 2022-09-09

## 2022-09-09 ENCOUNTER — HOSPITAL ENCOUNTER (OUTPATIENT)
Dept: LAB | Facility: MEDICAL CENTER | Age: 42
End: 2022-09-09
Attending: STUDENT IN AN ORGANIZED HEALTH CARE EDUCATION/TRAINING PROGRAM
Payer: COMMERCIAL

## 2022-09-09 LAB
BASOPHILS # BLD AUTO: 1.3 % (ref 0–1.8)
BASOPHILS # BLD: 0.09 K/UL (ref 0–0.12)
EOSINOPHIL # BLD AUTO: 0.61 K/UL (ref 0–0.51)
EOSINOPHIL NFR BLD: 9.1 % (ref 0–6.9)
ERYTHROCYTE [DISTWIDTH] IN BLOOD BY AUTOMATED COUNT: 45.8 FL (ref 35.9–50)
HCT VFR BLD AUTO: 48.1 % (ref 42–52)
HGB BLD-MCNC: 16.7 G/DL (ref 14–18)
IMM GRANULOCYTES # BLD AUTO: 0.02 K/UL (ref 0–0.11)
IMM GRANULOCYTES NFR BLD AUTO: 0.3 % (ref 0–0.9)
LYMPHOCYTES # BLD AUTO: 1.95 K/UL (ref 1–4.8)
LYMPHOCYTES NFR BLD: 29 % (ref 22–41)
MCH RBC QN AUTO: 32.3 PG (ref 27–33)
MCHC RBC AUTO-ENTMCNC: 34.7 G/DL (ref 33.7–35.3)
MCV RBC AUTO: 93 FL (ref 81.4–97.8)
MONOCYTES # BLD AUTO: 0.61 K/UL (ref 0–0.85)
MONOCYTES NFR BLD AUTO: 9.1 % (ref 0–13.4)
NEUTROPHILS # BLD AUTO: 3.45 K/UL (ref 1.82–7.42)
NEUTROPHILS NFR BLD: 51.2 % (ref 44–72)
NRBC # BLD AUTO: 0 K/UL
NRBC BLD-RTO: 0 /100 WBC
PLATELET # BLD AUTO: 366 K/UL (ref 164–446)
PMV BLD AUTO: 9 FL (ref 9–12.9)
RBC # BLD AUTO: 5.17 M/UL (ref 4.7–6.1)
WBC # BLD AUTO: 6.7 K/UL (ref 4.8–10.8)

## 2022-09-09 PROCEDURE — 36415 COLL VENOUS BLD VENIPUNCTURE: CPT

## 2022-09-09 PROCEDURE — 85025 COMPLETE CBC W/AUTO DIFF WBC: CPT

## 2022-12-27 ENCOUNTER — HOSPITAL ENCOUNTER (OUTPATIENT)
Dept: LAB | Facility: MEDICAL CENTER | Age: 42
End: 2022-12-27
Attending: STUDENT IN AN ORGANIZED HEALTH CARE EDUCATION/TRAINING PROGRAM
Payer: COMMERCIAL

## 2022-12-27 LAB
BASOPHILS # BLD AUTO: 1.4 % (ref 0–1.8)
BASOPHILS # BLD: 0.07 K/UL (ref 0–0.12)
EOSINOPHIL # BLD AUTO: 0.52 K/UL (ref 0–0.51)
EOSINOPHIL NFR BLD: 10.1 % (ref 0–6.9)
ERYTHROCYTE [DISTWIDTH] IN BLOOD BY AUTOMATED COUNT: 39.8 FL (ref 35.9–50)
HCT VFR BLD AUTO: 53.5 % (ref 42–52)
HGB BLD-MCNC: 18.6 G/DL (ref 14–18)
IMM GRANULOCYTES # BLD AUTO: 0.02 K/UL (ref 0–0.11)
IMM GRANULOCYTES NFR BLD AUTO: 0.4 % (ref 0–0.9)
LYMPHOCYTES # BLD AUTO: 1.67 K/UL (ref 1–4.8)
LYMPHOCYTES NFR BLD: 32.6 % (ref 22–41)
MCH RBC QN AUTO: 31.9 PG (ref 27–33)
MCHC RBC AUTO-ENTMCNC: 34.8 G/DL (ref 33.7–35.3)
MCV RBC AUTO: 91.8 FL (ref 81.4–97.8)
MONOCYTES # BLD AUTO: 0.4 K/UL (ref 0–0.85)
MONOCYTES NFR BLD AUTO: 7.8 % (ref 0–13.4)
NEUTROPHILS # BLD AUTO: 2.45 K/UL (ref 1.82–7.42)
NEUTROPHILS NFR BLD: 47.7 % (ref 44–72)
NRBC # BLD AUTO: 0 K/UL
NRBC BLD-RTO: 0 /100 WBC
PLATELET # BLD AUTO: 275 K/UL (ref 164–446)
PMV BLD AUTO: 9.7 FL (ref 9–12.9)
PSA SERPL-MCNC: 0.89 NG/ML (ref 0–4)
RBC # BLD AUTO: 5.83 M/UL (ref 4.7–6.1)
TESTOST SERPL-MCNC: 382 NG/DL (ref 175–781)
WBC # BLD AUTO: 5.1 K/UL (ref 4.8–10.8)

## 2022-12-27 PROCEDURE — 84153 ASSAY OF PSA TOTAL: CPT

## 2022-12-27 PROCEDURE — 85025 COMPLETE CBC W/AUTO DIFF WBC: CPT

## 2022-12-27 PROCEDURE — 36415 COLL VENOUS BLD VENIPUNCTURE: CPT

## 2022-12-27 PROCEDURE — 84403 ASSAY OF TOTAL TESTOSTERONE: CPT

## 2023-02-08 ENCOUNTER — HOSPITAL ENCOUNTER (EMERGENCY)
Facility: MEDICAL CENTER | Age: 43
End: 2023-02-08
Attending: EMERGENCY MEDICINE
Payer: OTHER MISCELLANEOUS

## 2023-02-08 ENCOUNTER — APPOINTMENT (OUTPATIENT)
Dept: RADIOLOGY | Facility: MEDICAL CENTER | Age: 43
End: 2023-02-08
Attending: EMERGENCY MEDICINE
Payer: OTHER MISCELLANEOUS

## 2023-02-08 VITALS
OXYGEN SATURATION: 96 % | BODY MASS INDEX: 30.82 KG/M2 | WEIGHT: 191.8 LBS | HEART RATE: 85 BPM | SYSTOLIC BLOOD PRESSURE: 125 MMHG | DIASTOLIC BLOOD PRESSURE: 79 MMHG | RESPIRATION RATE: 16 BRPM | HEIGHT: 66 IN | TEMPERATURE: 98 F

## 2023-02-08 DIAGNOSIS — S80.01XA CONTUSION OF RIGHT KNEE, INITIAL ENCOUNTER: ICD-10-CM

## 2023-02-08 PROCEDURE — 73564 X-RAY EXAM KNEE 4 OR MORE: CPT | Mod: RT

## 2023-02-08 PROCEDURE — 99283 EMERGENCY DEPT VISIT LOW MDM: CPT

## 2023-02-08 ASSESSMENT — FIBROSIS 4 INDEX: FIB4 SCORE: 0.66

## 2023-02-08 NOTE — LETTER
"  FORM C-4:  EMPLOYEE’S CLAIM FOR COMPENSATION/ REPORT OF INITIAL TREATMENT  EMPLOYEE’S CLAIM - PROVIDE ALL INFORMATION REQUESTED   First Name Quinn Last Name Ayo Birthdate 1980  Sex male Claim Number   Home Address [address]             Zip [address]                                   Age  42 y.o. 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   Address or Location of Accident (if applicable) Work [1]   What were you doing at the time of accident? (if applicable) Checking Frac Tank    How did this injury or occupational disease occur? Be specific and answer in detail. Use additional sheet if necessary)  I was checking the Frac Tank measurement, I open the metal lid after i check it when i was closing the it bounce back and hit me @ the outer side fo my knee and shin   If you believe that you have an occupational disease, when did you first have knowledge of the disability and it relationship to your employment? na Witnesses to the Accident  Keon Urbano   Nature of Injury or Occupational Disease  Workers' Compensation Part(s) of Body Injured or Affected  Knee (R), Lower Leg (R), N/A    I CERTIFY THAT THE ABOVE IS TRUE AND CORRECT TO THE BEST OF MY KNOWLEDGE AND THAT I HAVE PROVIDED THIS INFORMATION IN ORDER TO OBTAIN THE BENEFITS OF [place] " [place]  I HEREBY AUTHORIZE ANY PHYSICIAN, CHIROPRACTOR, SURGEON, PRACTITIONER, OR OTHER PERSON, ANY HOSPITAL, INCLUDING [facility], ANY MEDICAL SERVICE ORGANIZATION, ANY INSURANCE COMPANY, OR OTHER INSTITUTION OR ORGANIZATION TO RELEASE TO EACH OTHER, ANY MEDICAL OR OTHER INFORMATION, INCLUDING BENEFITS PAID OR PAYABLE, PERTINENT TO THIS INJURY OR DISEASE, EXCEPT INFORMATION RELATIVE TO DIAGNOSIS, TREATMENT AND/OR COUNSELING FOR AIDS, PSYCHOLOGICAL CONDITIONS, ALCOHOL OR CONTROLLED SUBSTANCES, FOR WHICH I MUST GIVE SPECIFIC AUTHORIZATION.  A PHOTOSTAT OF THIS AUTHORIZATION SHALL BE AS VALID AS THE ORIGINAL.  Date      02/08/2023                                Place    [facility]                                                                         Employee’s Signature   THIS REPORT MUST BE COMPLETED AND MAILED WITHIN 3 WORKING DAYS OF TREATMENT   Place [facility], EMERGENCY DEPT                       Name of Facility [facility]   Date  2/8/2023 Diagnosis  (S80.01XA) Contusion of right knee, initial encounter Is there evidence the injured employee was under the influence of alcohol and/or another controlled substance at the time of accident?   Hour  6:17 PM Description of Injury or Disease  Contusion of right knee, initial encounter No   Treatment  Knee brace, crutches  Have you advised the patient to remain off work five days or more?         No   X-Ray Findings  Negative  Comments:4 view right knee x-ray negative If Yes   From Date    To Date      From information given by the employee, together with medical evidence, can you directly connect this injury or occupational disease as job incurred? Yes If No, is employee capable of: Full Duty  No Modified Duty  Yes   Is additional medical care by a physician indicated? Yes  Comments:Occupational medicine, possibly orthopedic consultation If Modified  "Duty, Specify any Limitations / Restrictions   Crutches and knee brace until pain-free or released by Worker's Comp. providers or orthopedist   Do you know of any previous injury or disease contributing to this condition or occupational disease? No    Date 2/8/2023 Print Doctor’s Name Andrea Gao certify the employer’s copy of this form was mailed on:   Address [address] [place] [id number] [phone] INSURER’S USE ONLY   Provider’s Tax ID Number [id number] Telephone Dept: [phone]    Doctor’s Signature e-ANDREA Gonzalez M.D. Degree  MD      Form C-4 (rev.10/07)                                                                         BRIEF DESCRIPTION OF RIGHTS AND BENEFITS  ([place]ursuant to [place] 616C.050)    Notice of Injury or Occupational Disease (Incident Report Form C-1): If an injury or occupational disease (OD) arises out of and in the course of employment, you must provide written notice to your employer as soon as practicable, but no later than 7 days after the accident or OD. Your employer shall maintain a sufficient supply of the required forms.    Claim for Compensation (Form C-4): If medical treatment is sought, the form C-4 is available at the place of initial treatment. A completed \"Claim for Compensation\" (Form C-4) must be filed within 90 days after an accident or OD. The treating physician or chiropractor must, within 3 working days after treatment, complete and mail to the employer, the employer's insurer and third-party , the Claim for Compensation.    Medical Treatment: If you require medical treatment for your on-the-job injury or OD, you may be required to select a physician or chiropractor from a list provided by your workers’ compensation insurer, if it has contracted with an Organization for Managed Care (MCO) or Preferred Provider Organization (PPO) or providers of health care. If your employer has not entered into a contract with an MCO or PPO, you may " select a physician or chiropractor from the Panel of Physicians and Chiropractors. Any medical costs related to your industrial injury or OD will be paid by your insurer.    Temporary Total Disability (TTD): If your doctor has certified that you are unable to work for a period of at least 5 consecutive days, or 5 cumulative days in a 20-day period, or places restrictions on you that your employer does not accommodate, you may be entitled to TTD compensation.    Temporary Partial Disability (TPD): If the wage you receive upon reemployment is less than the compensation for TTD to which you are entitled, the insurer may be required to pay you TPD compensation to make up the difference. TPD can only be paid for a maximum of 24 months.    Permanent Partial Disability (PPD): When your medical condition is stable and there is an indication of a PPD as a result of your injury or OD, within 30 days, your insurer must arrange for an evaluation by a rating physician or chiropractor to determine the degree of your PPD. The amount of your PPD award depends on the date of injury, the results of the PPD evaluation, your age and wage.    Permanent Total Disability (PTD): If you are medically certified by a treating physician or chiropractor as permanently and totally disabled and have been granted a PTD status by your insurer, you are entitled to receive monthly benefits not to exceed 66 2/3% of your average monthly wage. The amount of your PTD payments is subject to reduction if you previously received a lump-sum PPD award.    Vocational Rehabilitation Services: You may be eligible for vocational rehabilitation services if you are unable to return to the job due to a permanent physical impairment or permanent restrictions as a result of your injury or occupational disease.    Transportation and Per Fany Reimbursement: You may be eligible for travel expenses and per fany associated with medical treatment.    Reopening: You may be  able to reopen your claim if your condition worsens after claim closure.     Appeal Process: If you disagree with a written determination issued by the insurer or the insurer does not respond to your request, you may appeal to the Department of Administration, , by following the instructions contained in your determination letter. You must appeal the determination within 70 days from the date of the determination letter at [address], [address], [place], [place] [address], or [address], [place], [place] [address]. If you disagree with the  decision, you may appeal to the Department of Administration, . You must file your appeal within 30 days from the date of the  decision letter at [address], [address], [place], [place] [address], or [address], [place], [place] [address]. If you disagree with a decision of an , you may file a petition for judicial review with the District Court. You must do so within 30 days of the Appeal Officer’s decision. You may be represented by an  at your own expense or you may contact the [facility] for possible representation.    [place]  for Injured Workers (NAIW): If you disagree with a  decision, you may request that NAIW represent you without charge at an  Hearing. For information regarding denial of benefits, you may contact the [facility] at: [address], [address], [place] [address], [phone], or [address], [place] [address], [phone]    To File a Complaint with the Division: If you wish to file a complaint with the  of the Division of Industrial Relations (DIR),  please contact the Workers’ Compensation Section, [address], [place], [place] [address], telephone [phone], or [address], [place], [place]  [address], telephone [phone].    For assistance with Workers’ Compensation Issues: You may contact the [facility] Office for Consumer Health Assistance, [facility] [facility], [facility] [address], [place] [id number], Toll Free 1-783.115.7955, Web site: http://[facility].[place].gov/Programs/CALI E-mail: [email or web address]@[email or web address].gov  D-2 (rev. 10/20)                                                                                                                           02/08/2023             __________________________________________________________________                                    _________________            Employee Name / Signature                                                                                                                            Date

## 2023-02-08 NOTE — LETTER
"  FORM C-4:  EMPLOYEE’S CLAIM FOR COMPENSATION/ REPORT OF INITIAL TREATMENT  EMPLOYEE’S CLAIM - PROVIDE ALL INFORMATION REQUESTED   First Name Quinn Last Name Ayo Birthdate 1980  Sex male Claim Number   Home Address [address]             Zip [address]                                   Age  42 y.o. 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 (if applicable) Checking Frac Tank    How did this injury or occupational disease occur? Be specific and answer in detail. Use additional sheet if necessary)  I was checking the Frac Tank measurement, I open the metal lid after i check it when i was closing the it bounce back and hit me @ the outer side fo my knee and shin   If you believe that you have an occupational disease, when did you first have knowledge of the disability and it relationship to your employment? na Witnesses to the Accident  Keon Urbano   Nature of Injury or Occupational Disease  Workers' Compensation Part(s) of Body Injured or Affected  Knee (R), Lower Leg (R), N/A    I CERTIFY THAT THE ABOVE IS TRUE AND CORRECT TO THE BEST OF MY KNOWLEDGE AND THAT I HAVE PROVIDED THIS INFORMATION IN ORDER TO OBTAIN THE B[place]EFITS OF [place] " ([place], [place] 617 [place]).  I HEREBY AUTHORIZE ANY PHYSICIAN, CHIROPRACTOR, SURGEON, PRACTITIONER, OR OTHER PERSON, ANY HOSPITAL, INCLUDING [facility] OR [facility] HOSPITAL, ANY MEDICAL SERVICE ORGANIZATION, ANY INSURANCE COMPANY, OR OTHER INSTITUTION OR ORGANIZATION TO RELEASE TO EACH OTHER, ANY MEDICAL OR OTHER INFORMATION, INCLUDING BENEFITS PAID OR PAYABLE, PERTINENT TO THIS INJURY OR DISEASE, EXCEPT INFORMATION RELATIVE TO DIAGNOSIS, TREATMENT AND/OR COUNSELING FOR AIDS, PSYCHOLOGICAL CONDITIONS, ALCOHOL OR CONTROLLED SUBSTANCES, FOR WHICH I MUST GIVE SPECIFIC AUTHORIZATION.  A PHOTOSTAT OF THIS AUTHORIZATION SHALL BE AS VALID AS THE ORIGINAL.  Date                                      Place                                                                             Employee’s Signature   THIS REPORT MUST BE COMPLETED AND MAILED WITHIN 3 WORKING DAYS OF TREATMENT   Place [facility], EMERGENCY DEPT                       Name of Facility [facility]   Date  2/8/2023 Diagnosis  (S80.01XA) Contusion of right knee, initial encounter Is there evidence the injured employee was under the influence of alcohol and/or another controlled substance at the time of accident?   Hour  6:23 PM Description of Injury or Disease  Contusion of right knee, initial encounter No   Treatment  Knee brace, crutches  Have you advised the patient to remain off work five days or more?         No   X-Ray Findings  Negative  Comments:4 view right knee x-ray negative If Yes   From Date    To Date      From information given by the employee, together with medical evidence, can you directly connect this injury or occupational disease as job incurred? Yes If No, is employee capable of: Full Duty  No Modified Duty  Yes   Is additional medical care by a physician indicated? Yes  Comments:Occupational medicine, possibly orthopedic consultation If Modified Duty, Specify any  "Limitations / Restrictions   Crutches and knee brace until pain-free or released by Worker's Comp. providers or orthopedist   Do you know of any previous injury or disease contributing to this condition or occupational disease? No    Date 2/8/2023 Print Doctor’s Name Andrea Gao certify the employer’s copy of this form was mailed on:   Address [address] [place] [id number] [phone] INSURER’S USE ONLY   Provider’s Tax ID Number [id number] Telephone Dept: [phone]    Doctor’s Signature rika-ANDREA Gonzalez M.D. Degree        Form C-4 (rev.10/07)                                                                         BRIEF DESCRIPTION OF RIGHTS AND BENEFITS  ([place] to [place] 616C.050)    Notice of Injury or Occupational Disease (Incident Report Form C-1): If an injury or occupational disease (OD) arises out of and in the course of employment, you must provide written notice to your employer as soon as practicable, but no later than 7 days after the accident or OD. Your employer shall maintain a sufficient supply of the required forms.    Claim for Compensation (Form C-4): If medical treatment is sought, the form C-4 is available at the place of initial treatment. A completed \"Claim for Compensation\" (Form C-4) must be filed within 90 days after an accident or OD. The treating physician or chiropractor must, within 3 working days after treatment, complete and mail to the employer, the employer's insurer and third-party , the Claim for Compensation.    Medical Treatment: If you require medical treatment for your on-the-job injury or OD, you may be required to select a physician or chiropractor from a list provided by your workers’ compensation insurer, if it has contracted with an Organization for Managed Care (MCO) or Preferred Provider Organization (PPO) or providers of health care. If your employer has not entered into a contract with an MCO or PPO, you may select a physician " or chiropractor from the Panel of Physicians and Chiropractors. Any medical costs related to your industrial injury or OD will be paid by your insurer.    Temporary Total Disability (TTD): If your doctor has certified that you are unable to work for a period of at least 5 consecutive days, or 5 cumulative days in a 20-day period, or places restrictions on you that your employer does not accommodate, you may be entitled to TTD compensation.    Temporary Partial Disability (TPD): If the wage you receive upon reemployment is less than the compensation for TTD to which you are entitled, the insurer may be required to pay you TPD compensation to make up the difference. TPD can only be paid for a maximum of 24 months.    Permanent Partial Disability (PPD): When your medical condition is stable and there is an indication of a PPD as a result of your injury or OD, within 30 days, your insurer must arrange for an evaluation by a rating physician or chiropractor to determine the degree of your PPD. The amount of your PPD award depends on the date of injury, the results of the PPD evaluation, your age and wage.    Permanent Total Disability (PTD): If you are medically certified by a treating physician or chiropractor as permanently and totally disabled and have been granted a PTD status by your insurer, you are entitled to receive monthly benefits not to exceed 66 2/3% of your average monthly wage. The amount of your PTD payments is subject to reduction if you previously received a lump-sum PPD award.    Vocational Rehabilitation Services: You may be eligible for vocational rehabilitation services if you are unable to return to the job due to a permanent physical impairment or permanent restrictions as a result of your injury or occupational disease.    Transportation and Per Fany Reimbursement: You may be eligible for travel expenses and per fany associated with medical treatment.    Reopening: You may be able to reopen your  claim if your condition worsens after claim closure.     Appeal Process: If you disagree with a written determination issued by the insurer or the insurer does not respond to your request, you may appeal to the Department of Administration, , by following the instructions contained in your determination letter. You must appeal the determination within 70 days from the date of the determination letter at [address], [address], [place], [place] [address], or [address], [place], [place] [address]. If you disagree with the  decision, you may appeal to the Department of Administration, . You must file your appeal within 30 days from the date of the  decision letter at [address], [address], [place], [place] [address], or [address], [facility] [address], [place], [place] [address]. If you disagree with a decision of an , you may file a petition for judicial review with the District Court. You must do so within 30 days of the Appeal Officer’s decision. You may be represented by an  at your own expense or you may contact the [facility] for possible representation.    [place]  for [place] ([place]): If you disagree with a  decision, you may request that NAIW represent you without charge at an  Hearing. For information regarding denial of benefits, you may contact the [facility] at: [address], [address] [address], [phone], or [address], [place] [address], [phone]    To File a Complaint with the Division: If you wish to file a complaint with the  of the Division of Industrial Relations (DIR),  please contact the Workers’ Compensation Section, [address] [facility] [facility], [facility] [address], [place], [place] [address], telephone (726) 794-2545, or [address] [facility] [facility] [address], [place], [place] [address], telephone  [phone].    For assistance with Workers’ Compensation Issues: You may contact the [facility] Office for Consumer Health Assistance, [facility] [facility], [address], [place] [address], Toll Free 1-739.345.5588, Web site: http://[facility].[place].gov/Programs/CALI E-mail: [email or web address]@[email or web address].[email or web address].gov  D-2 (rev. 10/20)              __________________________________________________________________                                    _________________            Employee Name / Signature                                                                                                                            Date

## 2023-02-09 NOTE — ED PROVIDER NOTES
"ED Provider Note    CHIEF COMPLAINT  Chief Complaint   Patient presents with    Leg Pain     R sided. Pt was at work last night when a heavy metal lid bounced back and hit him in the side of the knee. Pt was told to come here for CT scan.            HPI/ROS  LIMITATION TO HISTORY   Select: : None  OUTSIDE HISTORIAN(S):  None    Quinn Rollins is a 42 y.o. male who presents complaining of right knee pain, onset today when a heavy metal lid struck his knee.  He states he was seen at occupational medicine, no imaging was obtained, states he was told to go to the ER for a \"CAT scan\".  Patient complains of anterior and right lateral knee pain.  He is able to walk however with some discomfort and a limp.  No acute numbness or weakness.  He denies other injury    PAST MEDICAL HISTORY   has a past medical history of History of COVID-19 ().    SURGICAL HISTORY  patient denies any surgical history    FAMILY HISTORY  Family History   Problem Relation Age of Onset    Cancer Brother         brain tumor       SOCIAL HISTORY  Social History     Tobacco Use    Smoking status: Every Day     Packs/day: 0.50     Years: 24.00     Pack years: 12.00     Types: Cigarettes     Start date: 10/11/1998     Last attempt to quit: 2022     Years since quittin.6    Smokeless tobacco: Never   Vaping Use    Vaping Use: Every day    Start date: 2022    Substances: Nicotine   Substance and Sexual Activity    Alcohol use: Not Currently     Comment: quit     Drug use: No    Sexual activity: Not Currently     Comment:        CURRENT MEDICATIONS  Home Medications       Reviewed by Kerline Garcia R.N. (Registered Nurse) on 23 at 1638  Med List Status: Not Addressed     Medication Last Dose Status   acetaminophen (TYLENOL) 325 MG Tab  Active   cetirizine (ZYRTEC) 10 MG Tab  Active   fluticasone (FLONASE) 50 MCG/ACT nasal spray  Active   ibuprofen (MOTRIN) 800 MG Tab  Active   olopatadine (PATANOL) 0.1 % ophthalmic " "solution  Active                    ALLERGIES  No Known Allergies    PHYSICAL EXAM  VITAL SIGNS: /83   Pulse 91   Temp 36.9 °C (98.4 °F) (Temporal)   Resp 18   Ht 1.676 m (5' 6\")   Wt 87 kg (191 lb 12.8 oz)   SpO2 95%   BMI 30.96 kg/m²    Skin: Anterior swelling right knee, small abrasion right lateral knee.  Musculoskeletal: Patella and right lateral knee tenderness without crepitance or deformity.  Right ankle and right hip are nontender  Neurologic: Sensation normal right leg  Vascular: Normal pulse right foot    DIAGNOSTIC STUDIES / PROCEDURES      RADIOLOGY  I have independently interpreted the diagnostic imaging associated with this visit and am waiting the final reading from the radiologist.   My preliminary interpretation is a follows: No fracture  Radiologist interpretation:   DX-KNEE COMPLETE 4+ RIGHT   Final Result      No fracture or dislocation of RIGHT knee.            COURSE & MEDICAL DECISION MAKING    ED Observation Status? No; Patient does not meet criteria for ED Observation.     INITIAL ASSESSMENT, COURSE AND PLAN  Care Narrative: Patient sent here for evaluation of knee injury.  4 view x-ray of the right knee is negative.  I do not find reason for CT scan of the knee at this time, no evidence of tibial plateau fracture, no effusion.  He is given knee immobilizer, crutches, advised to follow-up with Worker's Comp. for reevaluation and return to work instructions.  He is given referral to orthopedics if not better in 1 to 2 weeks.  Patient is advised to use his crutches and knee immobilizer if it is painful to walk.  Patient placed on light duty at work.          DISPOSITION AND DISCUSSIONS      Decision tools and prescription drugs considered including, but not limited to: Pain Medications prescription not required, over-the-counter medications adequate .    FINAL DIAGNOSIS  1. Contusion of right knee, initial encounter           Electronically signed by: Andrea Gao M.D., " 2/8/2023 5:18 PM

## 2023-02-09 NOTE — DISCHARGE INSTRUCTIONS
Follow-up with occupational medicine and orthopedics if not better in 1 week.  Use crutches and splint for comfort.  Return for any concerns.

## 2023-02-09 NOTE — ED TRIAGE NOTES
Chief Complaint   Patient presents with    Leg Pain     R sided. Pt was at work last night when a heavy metal lid bounced back and hit him in the side of the knee. Pt was told to come here for CT scan.      Pt ambulatory to triage for above complaint. Pt has a limp when walking. NAD.

## 2023-02-14 ENCOUNTER — OCCUPATIONAL MEDICINE (OUTPATIENT)
Dept: OCCUPATIONAL MEDICINE | Facility: CLINIC | Age: 43
End: 2023-02-14
Payer: OTHER MISCELLANEOUS

## 2023-02-14 VITALS
SYSTOLIC BLOOD PRESSURE: 138 MMHG | DIASTOLIC BLOOD PRESSURE: 82 MMHG | TEMPERATURE: 98.6 F | BODY MASS INDEX: 32.05 KG/M2 | HEIGHT: 66 IN | HEART RATE: 89 BPM | WEIGHT: 199.4 LBS | OXYGEN SATURATION: 96 % | RESPIRATION RATE: 16 BRPM

## 2023-02-14 DIAGNOSIS — S80.01XD CONTUSION OF RIGHT KNEE, SUBSEQUENT ENCOUNTER: ICD-10-CM

## 2023-02-14 PROCEDURE — 99203 OFFICE O/P NEW LOW 30 MIN: CPT | Performed by: PREVENTIVE MEDICINE

## 2023-02-14 ASSESSMENT — FIBROSIS 4 INDEX: FIB4 SCORE: 0.66

## 2023-02-14 NOTE — LETTER
[address],   [address] [place] [address]  Phone:  [phone] - Fax:  [phone]   Occupational Health [facility] Progress Report and Disability Certification  Date of Service: 2/14/2023   No Show:  No  Date / Time of Next Visit: 2/28/2023   Claim Information   Patient Name: Quinn Rollins  Claim Number:     Employer:   [employer] Date of Injury: 2/7/2023     Insurer / TPA: M[employer] Workers Comp  ID / SSN:     Occupation: Syrup Supervisor  Diagnosis: The encounter diagnosis was Contusion of right knee, subsequent encounter.    Medical Information   Related to Industrial Injury? 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  LESLIE: Right knee pain after a heavy metal lid struck his knee.  He was seen in the ER, x-rays of the knee were negative for acute findings.  Patient states that overall symptoms are about the same.  Has improved just a little bit.  Pain is mostly on the lateral aspect of the knee.  He has been using the knee immobilizer but thinks it is worsening his symptoms.  He states has been walking without it at home.  Denies prior significant injuries to the right knee.  Does not recall twisting the knee.   Objective Findings: Right knee: No gross deformity.  Tenderness palpation over the tibial plateau and lateral knee diffusely.  Full range of motion with mild discomfort and full extension/flexion.  Anterior/posterior drawer test negative.  No laxity varus valgus stress.  Only able to squat a little bit due to pain.  Slight antalgic gait.   Pre-Existing Condition(s):     Assessment:   Condition Same    Status: Additional Care Required  Permanent Disability:No    Plan:      Diagnostics:      Comments:  Referral to physical therapy  Provided hinged knee brace, increase walking gradually as tolerated  OTC ibuprofen/Tylenol as needed  Okay to use heat and/or ice as needed  Okay to use OTC muscle  creams/ointments as needed  Restricted duty  Follow-up 2 weeks    Disability Information   Status: Released to Restricted Duty    From:  2023  Through: 2023 Restrictions are: Temporary   Physical Restrictions   Sitting:    Standing:  < or = to 2 hrs/day Stooping:    Bending:      Squattin hrs/day Walking:  < or = to 2 hrs/day Climbin hrs/day Pushing:      Pulling:    Other:    Reaching Above Shoulder (L):   Reaching Above Shoulder (R):       Reaching Below Shoulder (L):    Reaching Below Shoulder (R):      Not to exceed Weight Limits   Carrying(hrs):   Weight Limit(lb): < or = to 10 pounds Lifting(hrs):   Weight  Limit(lb): < or = to 10 pounds   Comments: Seated work 75% shift    Repetitive Actions   Hands: i.e. Fine Manipulations from Grasping:     Feet: i.e. Operating Foot Controls:     Driving / Operate Machinery:     Health Care Provider’s Original or Electronic Signature  Vahid Becker D.O. Health Care Provider’s Original or Electronic Signature    Vahid Becker DO MPH     Clinic Name / Location: [address],   [address]  [place] [address] Clinic Phone Number: Dept: [phone]   Appointment Time: 1:15 Pm Visit Start Time: 1:12 PM   Check-In Time:  1:03 Pm Visit Discharge Time:  1:43 PM   Original-Treating Physician or Chiropractor    Page 2-Insurer/TPA    Page 3-Employer    Page 4-Employee

## 2023-02-14 NOTE — PROGRESS NOTES
"Subjective:     Quinn Rollins is a 42 y.o. 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  LESLIE: Right knee pain after a heavy metal lid struck his knee.  He was seen in the ER, x-rays of the knee were negative for acute findings.  Patient states that overall symptoms are about the same.  Has improved just a little bit.  Pain is mostly on the lateral aspect of the knee.  He has been using the knee immobilizer but thinks it is worsening his symptoms.  He states has been walking without it at home.  Denies prior significant injuries to the right knee.  Does not recall twisting the knee.    ROS: All systems were reviewed on intake form, form was reviewed and signed. See scanned documents in media. Pertinent positives and negatives included in HPI.    PMH: No pertinent past medical history to this problem  MEDS: Medications were reviewed in Epic  ALLERGIES: No Known Allergies  SOCHX: Works as a syrup supervisor at [employer]  FH: No pertinent family history to this problem       Objective:     /82 (BP Location: Left arm, Patient Position: Sitting, BP Cuff Size: Large adult)   Pulse 89   Temp 37 °C (98.6 °F) (Temporal)   Resp 16   Ht 1.676 m (5' 6\")   Wt 90.4 kg (199 lb 6.4 oz)   SpO2 96%   BMI 32.18 kg/m²     Constitutional: Patient is in no acute distress. Appears well-developed and well-nourished.   HENT: Normocephalic and atraumatic. EOM are normal. No scleral icterus.   Cardiovascular: Normal rate.    Pulmonary/Chest: Effort normal. No respiratory distress.   Neurological: Patient is alert and oriented to person, place, and time.   Skin: Skin is warm and dry.   Psychiatric: Normal mood and affect. Behavior is normal.     Right knee: No gross deformity.  Tenderness palpation over the tibial plateau and lateral knee diffusely.  Full range of motion with mild discomfort and full " extension/flexion.  Anterior/posterior drawer test negative.  No laxity varus valgus stress.  Only able to squat a little bit due to pain.  Slight antalgic gait.    Assessment/Plan:       1. Contusion of right knee, subsequent encounter    Released to Restricted Duty FROM 2/14/2023 TO 2/28/2023  Seated work 75% shift  Referral to physical therapy  Provided hinged knee brace, increase walking gradually as tolerated  OTC ibuprofen/Tylenol as needed  Okay to use heat and/or ice as needed  Okay to use OTC muscle creams/ointments as needed  Restricted duty  Follow-up 2 weeks    Differential diagnosis, natural history, supportive care, and indications for immediate follow-up discussed.    Approximately 35 minutes were spent in reviewing notes, preparing for visit, obtaining history, exam and evaluation, patient counseling/education and post visit documentation/orders.

## 2023-02-16 ENCOUNTER — APPOINTMENT (OUTPATIENT)
Dept: URGENT CARE | Facility: PHYSICIAN GROUP | Age: 43
End: 2023-02-16
Payer: COMMERCIAL

## 2023-02-17 ENCOUNTER — OFFICE VISIT (OUTPATIENT)
Dept: URGENT CARE | Facility: PHYSICIAN GROUP | Age: 43
End: 2023-02-17
Payer: COMMERCIAL

## 2023-02-17 VITALS
TEMPERATURE: 97.5 F | RESPIRATION RATE: 16 BRPM | BODY MASS INDEX: 31.98 KG/M2 | OXYGEN SATURATION: 96 % | WEIGHT: 199 LBS | HEIGHT: 66 IN | SYSTOLIC BLOOD PRESSURE: 126 MMHG | HEART RATE: 84 BPM | DIASTOLIC BLOOD PRESSURE: 84 MMHG

## 2023-02-17 DIAGNOSIS — R06.2 WHEEZING ON AUSCULTATION: ICD-10-CM

## 2023-02-17 DIAGNOSIS — B95.0 BACTERIAL INFECTION DUE TO STREPTOCOCCUS, GROUP A: Primary | ICD-10-CM

## 2023-02-17 DIAGNOSIS — R06.02 SOB (SHORTNESS OF BREATH): ICD-10-CM

## 2023-02-17 DIAGNOSIS — J02.9 PHARYNGITIS, UNSPECIFIED ETIOLOGY: ICD-10-CM

## 2023-02-17 DIAGNOSIS — J06.9 URI WITH COUGH AND CONGESTION: ICD-10-CM

## 2023-02-17 PROBLEM — N52.9 ERECTILE DYSFUNCTION: Status: ACTIVE | Noted: 2022-09-01

## 2023-02-17 LAB
FLUAV RNA SPEC QL NAA+PROBE: NEGATIVE
FLUBV RNA SPEC QL NAA+PROBE: NEGATIVE
RSV RNA SPEC QL NAA+PROBE: NEGATIVE
S PYO DNA SPEC NAA+PROBE: DETECTED
SARS-COV-2 RNA RESP QL NAA+PROBE: NOT DETECTED

## 2023-02-17 PROCEDURE — 0241U POCT CEPHEID COV-2, FLU A/B, RSV - PCR: CPT | Performed by: NURSE PRACTITIONER

## 2023-02-17 PROCEDURE — 87651 STREP A DNA AMP PROBE: CPT | Performed by: NURSE PRACTITIONER

## 2023-02-17 PROCEDURE — 99213 OFFICE O/P EST LOW 20 MIN: CPT | Performed by: NURSE PRACTITIONER

## 2023-02-17 RX ORDER — AMOXICILLIN 875 MG/1
875 TABLET, COATED ORAL 2 TIMES DAILY
Qty: 20 TABLET | Refills: 0 | Status: SHIPPED | OUTPATIENT
Start: 2023-02-17 | End: 2023-02-27

## 2023-02-17 RX ORDER — TESTOSTERONE CYPIONATE 200 MG/ML
INJECTION, SOLUTION INTRAMUSCULAR
COMMUNITY

## 2023-02-17 RX ORDER — METHYLPREDNISOLONE 4 MG/1
TABLET ORAL
Qty: 21 TABLET | Refills: 0 | Status: SHIPPED | OUTPATIENT
Start: 2023-02-17

## 2023-02-17 RX ORDER — ALBUTEROL SULFATE 90 UG/1
2 AEROSOL, METERED RESPIRATORY (INHALATION) EVERY 4 HOURS PRN
Qty: 1 EACH | Refills: 0 | Status: SHIPPED | OUTPATIENT
Start: 2023-02-17 | End: 2023-03-03

## 2023-02-17 ASSESSMENT — ENCOUNTER SYMPTOMS
HEADACHES: 1
FEVER: 1
SHORTNESS OF BREATH: 0
SPUTUM PRODUCTION: 0
NAUSEA: 0
SORE THROAT: 1
MYALGIAS: 1
VOMITING: 0
DIARRHEA: 0
COUGH: 1

## 2023-02-17 ASSESSMENT — FIBROSIS 4 INDEX: FIB4 SCORE: 0.66

## 2023-02-17 NOTE — PROGRESS NOTES
Subjective:     Quinn Rollins is a 42 y.o. male who presents for Fever (Cough, body aches, nasal congestion, x 3 days)      Fever   This is a new problem. The current episode started in the past 7 days (Quinn is a pleasant 42 year old male who presents to  today with complaints of a sore throat, congestion, cough and body aches X 3 day). The problem has been unchanged. His temperature was unmeasured (tactile) prior to arrival. Associated symptoms include congestion, coughing, headaches, sleepiness and a sore throat. Pertinent negatives include no diarrhea, nausea or vomiting. He has tried acetaminophen and NSAIDs for the symptoms. The treatment provided mild relief.   No known ill contacts.     Review of Systems   Constitutional:  Positive for fever and malaise/fatigue.   HENT:  Positive for congestion and sore throat.    Respiratory:  Positive for cough. Negative for sputum production and shortness of breath.    Gastrointestinal:  Negative for diarrhea, nausea and vomiting.   Musculoskeletal:  Positive for myalgias.   Neurological:  Positive for headaches.     PMH:   Past Medical History:   Diagnosis Date    History of COVID-2020     ALLERGIES: No Known Allergies  SURGHX: History reviewed. No pertinent surgical history.  SOCHX:   Social History     Socioeconomic History    Marital status: Single    Highest education level: Bachelor's degree (e.g., BA, AB, BS)   Tobacco Use    Smoking status: Every Day     Packs/day: 0.50     Years: 24.00     Pack years: 12.00     Types: Cigarettes     Start date: 10/11/1998     Last attempt to quit: 2022     Years since quittin.7    Smokeless tobacco: Never   Vaping Use    Vaping Use: Every day    Start date: 2022    Substances: Nicotine   Substance and Sexual Activity    Alcohol use: Not Currently     Comment: quit 2018    Drug use: No    Sexual activity: Not Currently     Comment:      Social Determinants of Health     Financial Resource Strain:  "Low Risk     Difficulty of Paying Living Expenses: Not hard at all   Food Insecurity: No Food Insecurity    Worried About Running Out of Food in the Last Year: Never true    Ran Out of Food in the Last Year: Never true   Transportation Needs: No Transportation Needs    Lack of Transportation (Medical): No    Lack of Transportation (Non-Medical): No   Physical Activity: Sufficiently Active    Days of Exercise per Week: 3 days    Minutes of Exercise per Session: 60 min   Stress: Stress Concern Present    Feeling of Stress : Rather much   Social Connections: Moderately Isolated    Frequency of Communication with Friends and Family: More than three times a week    Frequency of Social Gatherings with Friends and Family: Once a week    Attends [employer] Services: 1 to 4 times per year    Active Member of Clubs or Organizations: No    Attends Club or Organization Meetings: Never    Marital Status:    Housing Stability: High Risk    Unable to Pay for Housing in the Last Year: Yes    Number of Places Lived in the Last Year: 1    Unstable Housing in the Last Year: No     FH:   Family History   Problem Relation Age of Onset    Cancer Brother         brain tumor         Objective:   /84   Pulse 84   Temp 36.4 °C (97.5 °F)   Resp 16   Ht 1.676 m (5' 6\")   Wt 90.3 kg (199 lb)   SpO2 96%   BMI 32.12 kg/m²     Physical Exam  Vitals and nursing note reviewed.   Constitutional:       General: He is not in acute distress.     Appearance: Normal appearance. He is normal weight. He is ill-appearing.   HENT:      Head: Normocephalic and atraumatic.      Right Ear: Tympanic membrane, ear canal and external ear normal. There is no impacted cerumen.      Left Ear: Tympanic membrane, ear canal and external ear normal. There is no impacted cerumen.      Nose: Congestion and rhinorrhea present.      Mouth/Throat:      Mouth: Mucous membranes are moist.      Pharynx: Posterior oropharyngeal erythema present. No " oropharyngeal exudate.      Comments: Tonsils bilaterally +2, uvula midline  Eyes:      Extraocular Movements: Extraocular movements intact.      Pupils: Pupils are equal, round, and reactive to light.   Cardiovascular:      Rate and Rhythm: Normal rate and regular rhythm.      Pulses: Normal pulses.      Heart sounds: Normal heart sounds.   Pulmonary:      Effort: Pulmonary effort is normal. No respiratory distress.      Breath sounds: Examination of the right-middle field reveals decreased breath sounds. Examination of the left-middle field reveals decreased breath sounds. Examination of the right-lower field reveals decreased breath sounds. Examination of the left-lower field reveals decreased breath sounds. Decreased breath sounds and wheezing present.   Abdominal:      General: Abdomen is flat. Bowel sounds are normal.      Palpations: Abdomen is soft.      Tenderness: There is no abdominal tenderness. There is no right CVA tenderness or left CVA tenderness.   Musculoskeletal:         General: Normal range of motion.      Cervical back: Normal range of motion and neck supple. Tenderness present.   Lymphadenopathy:      Cervical: No cervical adenopathy.   Skin:     General: Skin is warm and dry.      Capillary Refill: Capillary refill takes less than 2 seconds.   Neurological:      General: No focal deficit present.      Mental Status: He is alert and oriented to person, place, and time. Mental status is at baseline.   Psychiatric:         Mood and Affect: Mood normal.         Behavior: Behavior normal.         Thought Content: Thought content normal.         Judgment: Judgment normal.     Cepheid strep: Positive, COVID: Negative, flu: Negative, RSV: Negative    Assessment/Plan:   Assessment    1. Bacterial infection due to streptococcus, group A  amoxicillin (AMOXIL) 875 MG tablet      2. Pharyngitis, unspecified etiology  POCT CEPHEID GROUP A STREP - PCR      3. URI with cough and congestion  POCT CEPHEID  COV-2, FLU A/B, RSV - PCR    methylPREDNISolone (MEDROL DOSEPAK) 4 MG Tablet Therapy Pack      4. Wheezing on auscultation  albuterol 108 (90 Base) MCG/ACT Aero Soln inhalation aerosol    methylPREDNISolone (MEDROL DOSEPAK) 4 MG Tablet Therapy Pack      5. SOB (shortness of breath)  albuterol 108 (90 Base) MCG/ACT Aero Soln inhalation aerosol    methylPREDNISolone (MEDROL DOSEPAK) 4 MG Tablet Therapy Pack        We discussed supportive measures including humidifier, warm salt water gargles, over-the-counter Cepacol throat lozenges, rest  and increased fluids. Pt was encouraged to seek treatment back in the ER or urgent care for worsening symptoms,  fever greater than 100.5, wheezes or shortness of breath.    AVS handout given and reviewed with patient. Pt educated on red flags and when to seek treatment back in ER or UC.

## 2023-02-17 NOTE — LETTER
February 17, 2023    To Whom It May Concern:         This is confirmation that Quinn Rollins attended his scheduled appointment with ROCKY Pillai on 2/17/23. Please excuse his absence starting 2/15/2023 due to an acute illness.   He may return to work on 2/21/2023 or sooner if better.        If you have any questions please do not hesitate to call me at the phone number listed below.    Sincerely,          MAIK Pillai.  [phone]

## 2023-02-28 ENCOUNTER — OCCUPATIONAL MEDICINE (OUTPATIENT)
Dept: OCCUPATIONAL MEDICINE | Facility: CLINIC | Age: 43
End: 2023-02-28
Payer: OTHER MISCELLANEOUS

## 2023-02-28 VITALS
TEMPERATURE: 98.6 F | HEIGHT: 66 IN | WEIGHT: 199 LBS | HEART RATE: 74 BPM | BODY MASS INDEX: 31.98 KG/M2 | OXYGEN SATURATION: 95 % | RESPIRATION RATE: 14 BRPM

## 2023-02-28 DIAGNOSIS — S80.01XD CONTUSION OF RIGHT KNEE, SUBSEQUENT ENCOUNTER: ICD-10-CM

## 2023-02-28 PROCEDURE — 99213 OFFICE O/P EST LOW 20 MIN: CPT | Performed by: PREVENTIVE MEDICINE

## 2023-02-28 ASSESSMENT — FIBROSIS 4 INDEX: FIB4 SCORE: 0.66

## 2023-02-28 NOTE — PROGRESS NOTES
"Subjective:     Quinn Rollins is a 42 y.o. 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  LESLIE: Right knee pain after a heavy metal lid struck his knee.  He was seen in the ER, x-rays of the knee were negative for acute findings.  Patient states that overall his had some gradual improvement in symptoms.  As of the less than before.  Feels that able to walk more.  Is continued popping with the knee.  He states when doing squats he reaches a certain point where there is a lot of pressure in the knee.  Has not heard from the work comp insurance and physical therapy is yet to be approved.    ROS       Objective:     Pulse 74   Temp 37 °C (98.6 °F) (Temporal)   Resp 14   Ht 1.676 m (5' 6\")   Wt 90.3 kg (199 lb)   SpO2 95%   BMI 32.12 kg/m²     Constitutional: Patient is in no acute distress. Appears well-developed and well-nourished.   Cardiovascular: Normal rate.    Pulmonary/Chest: Effort normal. No respiratory distress.   Neurological: Patient is alert and oriented to person, place, and time.   Skin: Skin is warm and dry.   Psychiatric: Normal mood and affect. Behavior is normal.     Right knee: No gross deformity.  Tenderness palpation over the tibial plateau and lateral knee diffusely.  Full range of motion with mild discomfort and full extension/flexion.  Slight antalgic gait.    Assessment/Plan:       1. Contusion of right knee, subsequent encounter    Released to Restricted Duty FROM 2/28/2023 TO 3/24/2023  Seated work 75% shift    Referral to physical therapy, pending approval  Gradually wean off hinged knee brace as tolerated, increase walking as tolerated  OTC ibuprofen/Tylenol as needed  Okay to use heat and/or ice as needed  Okay to use OTC muscle creams/ointments as needed  Restricted duty  Follow-up 3 weeks    Differential diagnosis, natural history, supportive care, and indications for " immediate follow-up discussed.    Approximately 25 minutes was spent in preparing for visit, obtaining history, exam and evaluation, patient counseling/education and post visit documentation/orders.

## 2023-02-28 NOTE — LETTER
[address],   [address] [place] [address]  Phone:  [phone] - Fax:  [phone]   Occupational Health [facility] Progress Report and Disability Certification  Date of Service: 2/28/2023   No Show:  No  Date / Time of Next Visit: 3/24/2023   Claim Information   Patient Name: Quinn Rollins  Claim Number:     Employer:   [employer] Date of Injury: 2/7/2023     Insurer / TPA: Misc Workers Comp  ID / SSN:     Occupation: Syrup Supervisor  Diagnosis: The encounter diagnosis was Contusion of right knee, subsequent encounter.    Medical Information   Related to Industrial Injury? 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  LESLIE: Right knee pain after a heavy metal lid struck his knee.  He was seen in the ER, x-rays of the knee were negative for acute findings.  Patient states that overall his had some gradual improvement in symptoms.  As of the less than before.  Feels that able to walk more.  Is continued popping with the knee.  He states when doing squats he reaches a certain point where there is a lot of pressure in the knee.  Has not heard from the work comp insurance and physical therapy is yet to be approved.   Objective Findings: Right knee: No gross deformity.  Tenderness palpation over the tibial plateau and lateral knee diffusely.  Full range of motion with mild discomfort and full extension/flexion.  Slight antalgic gait.   Pre-Existing Condition(s):     Assessment:   Condition Same    Status: Additional Care Required  Permanent Disability:No    Plan:      Diagnostics:      Comments:  Referral to physical therapy, pending approval  Gradually wean off hinged knee brace as tolerated, increase walking as tolerated  OTC ibuprofen/Tylenol as needed  Okay to use heat and/or ice as needed  Okay to use OTC muscle creams/ointments as needed  Restricted duty  Follow-up 3 weeks    Disability Information    Status: Released to Restricted Duty    From:  2/28/2023  Through: 3/24/2023 Restrictions are: Temporary   Physical Restrictions   Sitting:    Standing:  < or = to 2 hrs/day Stooping:    Bending:      Squatting:  < or = to 1 hr/day Walking:  < or = to 2 hrs/day Climbing:  < or = to 1 hr/day Pushing:      Pulling:    Other:    Reaching Above Shoulder (L):   Reaching Above Shoulder (R):       Reaching Below Shoulder (L):    Reaching Below Shoulder (R):      Not to exceed Weight Limits   Carrying(hrs):   Weight Limit(lb): < or = to 10 pounds Lifting(hrs):   Weight  Limit(lb): < or = to 10 pounds   Comments: Seated work 75% shift    Repetitive Actions   Hands: i.e. Fine Manipulations from Grasping:     Feet: i.e. Operating Foot Controls:     Driving / Operate Machinery:     Health Care Provider’s Original or Electronic Signature  Vahid Becker D.O. Health Care Provider’s Original or Electronic Signature    Vahid Becker DO MPH     Clinic Name / Location: [address],   [address] [address] Clinic Phone Number: Dept: [phone]   Appointment Time: 1:30 Pm Visit Start Time: 1:15 PM   Check-In Time:  1:09 Pm Visit Discharge Time:  1:38 PM   Original-Treating Physician or Chiropractor    Page 2-Insurer/TPA    Page 3-Employer    Page 4-Employee

## 2023-03-07 DIAGNOSIS — S80.01XD CONTUSION OF RIGHT KNEE, SUBSEQUENT ENCOUNTER: ICD-10-CM

## 2023-03-16 ENCOUNTER — OCCUPATIONAL MEDICINE (OUTPATIENT)
Dept: OCCUPATIONAL MEDICINE | Facility: CLINIC | Age: 43
End: 2023-03-16
Payer: OTHER MISCELLANEOUS

## 2023-03-16 VITALS
RESPIRATION RATE: 14 BRPM | OXYGEN SATURATION: 97 % | WEIGHT: 199 LBS | HEART RATE: 96 BPM | HEIGHT: 69 IN | DIASTOLIC BLOOD PRESSURE: 86 MMHG | TEMPERATURE: 98.3 F | BODY MASS INDEX: 29.47 KG/M2 | SYSTOLIC BLOOD PRESSURE: 128 MMHG

## 2023-03-16 DIAGNOSIS — S80.01XD CONTUSION OF RIGHT KNEE, SUBSEQUENT ENCOUNTER: ICD-10-CM

## 2023-03-16 PROCEDURE — 99213 OFFICE O/P EST LOW 20 MIN: CPT | Performed by: PREVENTIVE MEDICINE

## 2023-03-16 ASSESSMENT — FIBROSIS 4 INDEX: FIB4 SCORE: 0.66

## 2023-03-16 NOTE — LETTER
[address],   [address] [place] [address]  Phone:  [phone] - Fax:  [phone]   Occupational Health [facility] Progress Report and Disability Certification  Date of Service: 3/16/2023   No Show:  No  Date / Time of Next Visit: 4/6/2023 @ 1:15 PM   Claim Information   Patient Name: Quinn Rollins  Claim Number:     Employer:   [employer] Date of Injury: 2/7/2023     Insurer / TPA: Roshan Blake ID / SSN:     Occupation: Syrup Supervisor  Diagnosis: The encounter diagnosis was Contusion of right knee, subsequent encounter.    Medical Information   Related to Industrial Injury? 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  LESLIE: Right knee pain after a heavy metal lid struck his knee.  He was seen in the ER, x-rays of the knee were negative for acute findings.  Patient states overall symptoms about the same.  Continues to have pain over the tibial plateau and lateral knee.  Denies outright instability.  He states he does get some swelling in the knee.  Concerned about duration of symptoms.  He states he is set to start physical therapy next week.   Objective Findings: Right knee: No gross deformity.  Tenderness palpation over the tibial plateau and lateral knee diffusely.  Full range of motion with mild discomfort and full extension/flexion.  Slight antalgic gait.   Pre-Existing Condition(s):     Assessment:   Condition Same    Status: Additional Care Required  Permanent Disability:No    Plan:      Diagnostics:      Comments:  Placed referral for MRI right knee without  Begin physical therapy as scheduled  OTC ibuprofen/Tylenol as needed  Okay to use heat and/or ice as needed  Okay to use OTC muscle creams/ointments as needed  Restricted duty  Follow-up 3 weeks    Disability Information   Status: Released to Restricted Duty    From:  3/16/2023  Through: 4/6/2023 Restrictions are: Temporary    Physical Restrictions   Sitting:    Standing:  < or = to 2 hrs/day Stooping:    Bending:      Squatting:  < or = to 1 hr/day Walking:  < or = to 2 hrs/day Climbing:  < or = to 1 hr/day Pushing:      Pulling:    Other:    Reaching Above Shoulder (L):   Reaching Above Shoulder (R):       Reaching Below Shoulder (L):    Reaching Below Shoulder (R):      Not to exceed Weight Limits   Carrying(hrs):   Weight Limit(lb): < or = to 10 pounds Lifting(hrs):   Weight  Limit(lb): < or = to 10 pounds   Comments: Seated work 75% shift    Repetitive Actions   Hands: i.e. Fine Manipulations from Grasping:     Feet: i.e. Operating Foot Controls:     Driving / Operate Machinery:     Health Care Provider’s Original or Electronic Signature  Vahid Becker D.O. Health Care Provider’s Original or Electronic Signature    Vahid Becker DO MPH     [place] [address] [place] [address] Clinic Phone Number: Dept: [phone]   Appointment Time: 1:30 Pm Visit Start Time: 1:14 PM   Check-In Time:  1:10 Pm Visit Discharge Time: 2:02 PM   Original-Treating Physician or Chiropractor    Page 2-Insurer/TPA    Page 3-Employer    Page 4-Employee

## 2023-03-16 NOTE — PROGRESS NOTES
"Subjective:     Quinn Rollins is a 42 y.o. 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  LESLIE: Right knee pain after a heavy metal lid struck his knee.  He was seen in the ER, x-rays of the knee were negative for acute findings.  Patient states overall symptoms about the same.  Continues to have pain over the tibial plateau and lateral knee.  Denies outright instability.  He states he does get some swelling in the knee.  Concerned about duration of symptoms.  He states he is set to start physical therapy next week.    ROS    SOCHX: Works as a syrup supervisor at [employer]  FH: No pertinent family history to this problem.       Objective:     /86   Pulse 96   Temp 36.8 °C (98.3 °F) (Temporal)   Resp 14   Ht 1.753 m (5' 9\")   Wt 90.3 kg (199 lb)   SpO2 97%   BMI 29.39 kg/m²     Constitutional: Patient is in no acute distress. Appears well-developed and well-nourished.   Cardiovascular: Normal rate.    Pulmonary/Chest: Effort normal. No respiratory distress.   Neurological: Patient is alert and oriented to person, place, and time.   Skin: Skin is warm and dry.   Psychiatric: Normal mood and affect. Behavior is normal.     Right knee: No gross deformity.  Tenderness palpation over the tibial plateau and lateral knee diffusely.  Full range of motion with mild discomfort and full extension/flexion.  Slight antalgic gait.    Assessment/Plan:       1. Contusion of right knee, subsequent encounter  - Referral to Radiology  - MR-KNEE-W/O RIGHT; Future    Released to Restricted Duty FROM 3/16/2023 TO 4/6/2023  Seated work 75% shift    Placed referral for MRI right knee without  Begin physical therapy as scheduled  OTC ibuprofen/Tylenol as needed  Okay to use heat and/or ice as needed  Okay to use OTC muscle creams/ointments as needed  Restricted duty  Follow-up 3 weeks    Differential diagnosis, natural history, supportive care, " and indications for immediate follow-up discussed.    Approximately 25 minutes was spent in preparing for visit, obtaining history, exam and evaluation, patient counseling/education and post visit documentation/orders.

## 2023-04-04 ENCOUNTER — APPOINTMENT (OUTPATIENT)
Dept: RADIOLOGY | Facility: MEDICAL CENTER | Age: 43
End: 2023-04-04
Attending: PREVENTIVE MEDICINE
Payer: OTHER MISCELLANEOUS

## 2023-05-05 ENCOUNTER — OCCUPATIONAL MEDICINE (OUTPATIENT)
Dept: OCCUPATIONAL MEDICINE | Facility: CLINIC | Age: 43
End: 2023-05-05
Payer: COMMERCIAL

## 2023-05-05 VITALS
OXYGEN SATURATION: 96 % | HEIGHT: 66 IN | TEMPERATURE: 97.5 F | HEART RATE: 96 BPM | BODY MASS INDEX: 32.12 KG/M2 | DIASTOLIC BLOOD PRESSURE: 98 MMHG | SYSTOLIC BLOOD PRESSURE: 142 MMHG

## 2023-05-05 DIAGNOSIS — S80.01XD CONTUSION OF RIGHT KNEE, SUBSEQUENT ENCOUNTER: ICD-10-CM

## 2023-05-05 PROCEDURE — 99213 OFFICE O/P EST LOW 20 MIN: CPT | Performed by: PREVENTIVE MEDICINE

## 2023-05-05 NOTE — PROGRESS NOTES
"Subjective:     Quinn Rollins is a 42 y.o. 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  LESLIE: Right knee pain after a heavy metal lid struck his knee.  He was seen in the ER, x-rays of the knee were negative for acute findings.  Patient states overall he has had some improvement with physical therapy.  He states he still gets some pain and clicking in the knee with squatting and randomly few times throughout the workday.  He states physical therapy has helped a lot and he would like to do some more if able.  He was able to get his MRI last week as well.    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       Objective:     BP (!) 142/98   Pulse 96   Temp 36.4 °C (97.5 °F)   Ht 1.676 m (5' 6\")   SpO2 96%   BMI 32.12 kg/m²     Constitutional: Patient is in no acute distress. Appears well-developed and well-nourished.   Cardiovascular: Normal rate.    Pulmonary/Chest: Effort normal. No respiratory distress.   Neurological: Patient is alert and oriented to person, place, and time.   Skin: Skin is warm and dry.   Psychiatric: Normal mood and affect. Behavior is normal.     Right knee: No gross deformity.  Full range of motion.  Normal gait.    MRI Knee: Full-thickness chondral fissure involving the medial facet of the patella with subchondral marrow edema. Otherwise, essentially unremarkable right knee MRI. Minimal medial compartment chondrosis. Mild anterior knee soft tissue swelling. No osseous contusion or fracture.    Assessment/Plan:       1. Contusion of right knee, subsequent encounter  - Referral to Physical Therapy    Released to Full Duty FROM 5/5/2023 TO 6/1/2023       We will continue conservative management at this time  Referral to physical therapy  OTC ibuprofen/Tylenol as needed  Okay to use heat and/or ice as needed  Okay to use " OTC muscle creams/ointments as needed  Full duty  Follow-up 4 weeks    Differential diagnosis, natural history, supportive care, and indications for immediate follow-up discussed.    Approximately 25 minutes was spent in preparing for visit, obtaining history, exam and evaluation, patient counseling/education and post visit documentation/orders.

## 2023-05-05 NOTE — LETTER
[address],   [address] [place] [address]  Phone:  [phone] - Fax:  [phone]   Occupational Health [facility] Progress Report and Disability Certification  Date of Service: 5/5/2023   No Show:  No  Date / Time of Next Visit: 6/1/2023 @ 3:00 PM   Claim Information   Patient Name: Quinn Rollins  Claim Number:     Employer:   [employer] Date of Injury: 2/7/2023     Insurer / TPA: Roshan Blake  ID / SSN:     Occupation: Syrup Supervisor  Diagnosis: The encounter diagnosis was Contusion of right knee, subsequent encounter.    Medical Information   Related to Industrial Injury? 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  LESLIE: Right knee pain after a heavy metal lid struck his knee.  He was seen in the ER, x-rays of the knee were negative for acute findings.  Patient states overall he has had some improvement with physical therapy.  He states he still gets some pain and clicking in the knee with squatting and randomly few times throughout the workday.  He states physical therapy has helped a lot and he would like to do some more if able.  He was able to get his MRI last week as well.   Objective Findings: Right knee: No gross deformity.  Full range of motion.  Normal gait.    MRI Knee: Full-thickness chondral fissure involving the medial facet of the patella with subchondral marrow edema. Otherwise, essentially unremarkable right knee MRI. Minimal medial compartment chondrosis. Mild anterior knee soft tissue swelling. No osseous contusion or fracture.   Pre-Existing Condition(s):     Assessment:   Condition Improved    Status: Additional Care Required  Permanent Disability:No    Plan:      Diagnostics:      Comments:  We will continue conservative management at this time  Referral to physical therapy  OTC ibuprofen/Tylenol as needed  Okay to use heat and/or ice as needed  Okay to use OTC muscle  creams/ointments as needed  Full duty  Follow-up 4 weeks    Disability Information   Status: Released to Full Duty    From:  5/5/2023  Through: 6/1/2023 Restrictions are:     Physical Restrictions   Sitting:    Standing:    Stooping:    Bending:      Squatting:    Walking:    Climbing:    Pushing:      Pulling:    Other:    Reaching Above Shoulder (L):   Reaching Above Shoulder (R):       Reaching Below Shoulder (L):    Reaching Below Shoulder (R):      Not to exceed Weight Limits   Carrying(hrs):   Weight Limit(lb):   Lifting(hrs):   Weight  Limit(lb):     Comments:      Repetitive Actions   Hands: i.e. Fine Manipulations from Grasping:     Feet: i.e. Operating Foot Controls:     Driving / Operate Machinery:     Health Care Provider’s Original or Electronic Signature  Vahid Becker D.O. Health Care Provider’s Original or Electronic Signature    Vahid Becker DO [facility]     Clinic Name / Location: [address],   [address] [place] [address] Clinic Phone Number: Dept: [phone]   Appointment Time: 2:45 Pm Visit Start Time: 2:49 PM   Check-In Time:  2:43 Pm Visit Discharge Time:  3:18 PM   Original-Treating Physician or Chiropractor    Page 2-Insurer/TPA    Page 3-Employer    Page 4-Employee

## 2023-06-01 ENCOUNTER — OCCUPATIONAL MEDICINE (OUTPATIENT)
Dept: OCCUPATIONAL MEDICINE | Facility: CLINIC | Age: 43
End: 2023-06-01
Payer: COMMERCIAL

## 2023-06-01 VITALS
HEIGHT: 66 IN | BODY MASS INDEX: 31.98 KG/M2 | HEART RATE: 80 BPM | TEMPERATURE: 98.1 F | SYSTOLIC BLOOD PRESSURE: 124 MMHG | DIASTOLIC BLOOD PRESSURE: 80 MMHG | OXYGEN SATURATION: 100 % | WEIGHT: 199 LBS | RESPIRATION RATE: 16 BRPM

## 2023-06-01 DIAGNOSIS — S80.01XD CONTUSION OF RIGHT KNEE, SUBSEQUENT ENCOUNTER: ICD-10-CM

## 2023-06-01 PROCEDURE — 3074F SYST BP LT 130 MM HG: CPT | Performed by: PREVENTIVE MEDICINE

## 2023-06-01 PROCEDURE — 3079F DIAST BP 80-89 MM HG: CPT | Performed by: PREVENTIVE MEDICINE

## 2023-06-01 PROCEDURE — 99213 OFFICE O/P EST LOW 20 MIN: CPT | Performed by: PREVENTIVE MEDICINE

## 2023-06-01 RX ORDER — MELOXICAM 15 MG/1
15 TABLET ORAL DAILY
Qty: 30 TABLET | Refills: 0 | Status: SHIPPED | OUTPATIENT
Start: 2023-06-01 | End: 2023-06-29

## 2023-06-01 ASSESSMENT — FIBROSIS 4 INDEX: FIB4 SCORE: 0.66

## 2023-06-01 NOTE — LETTER
[address],   [address] [place] [address]  Phone:  [phone] - Fax:  [phone]   Occupational Health [facility] Progress Report and Disability Certification  Date of Service: 6/1/2023   No Show:  No  Date / Time of Next Visit: 6/29/2023@ 3 PM   Claim Information   Patient Name: Quinn Rollins  Claim Number:     Employer:    Date of Injury: 2/7/2023     Insurer / TPA: Roshan Blake  ID / SSN:     Occupation: Syrup Supervisor  Diagnosis: The encounter diagnosis was Contusion of right knee, subsequent encounter.    Medical Information   Related to Industrial Injury? 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  LESLIE: Right knee pain after a heavy metal lid struck his knee.  He was seen in the ER, x-rays of the knee were negative for acute findings.      Patient states that overall continues to feel about 80 to 85% improvement in right knee.  However he continues to get episodes of moderate pain.  These are short-lived but do not follow a specific pattern.  He has been doing physical therapy once a week she does receive good benefit from.  He states he is taking occasional ibuprofen for relief.   Objective Findings: Right knee: No gross deformity.  Full range of motion.  Normal gait.     MRI Knee: Full-thickness chondral fissure involving the medial facet of the patella with subchondral marrow edema. Otherwise, essentially unremarkable right knee MRI. Minimal medial compartment chondrosis. Mild anterior knee soft tissue swelling. No osseous contusion or fracture.   Pre-Existing Condition(s):     Assessment:   Condition Same    Status: Additional Care Required  Permanent Disability:No    Plan:      Diagnostics:      Comments:  Continue physical therapy  Prescribe meloxicam 15 mg once daily  Okay to use heat or ice as needed  Full duty  Follow-up 3 weeks    Disability Information   Status:  Released to Full Duty    From:  6/1/2023  Through: 6/29/2023 Restrictions are:     Physical Restrictions   Sitting:    Standing:    Stooping:    Bending:      Squatting:    Walking:    Climbing:    Pushing:      Pulling:    Other:    Reaching Above Shoulder (L):   Reaching Above Shoulder (R):       Reaching Below Shoulder (L):    Reaching Below Shoulder (R):      Not to exceed Weight Limits   Carrying(hrs):   Weight Limit(lb):   Lifting(hrs):   Weight  Limit(lb):     Comments:      Repetitive Actions   Hands: i.e. Fine Manipulations from Grasping:     Feet: i.e. Operating Foot Controls:     Driving / Operate Machinery:     Health Care Provider’s Original or Electronic Signature  Vahid Becker D.O. Health Care Provider’s Original or Electronic Signature    Vahdi Becker DO MPH     Clinic Name / Location: [address],   [address] [address] Clinic Phone Number: Dept: [phone]   Appointment Time: 3:00 Pm Visit Start Time: 3:04 PM   Check-In Time:  2:54 Pm Visit Discharge Time:  4:02PM   Original-Treating Physician or Chiropractor    Page 2-Insurer/TPA    Page 3-Employer    Page 4-Employee

## 2023-06-01 NOTE — PROGRESS NOTES
"Subjective:     Quinn Rollins is a 42 y.o. 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  LESLIE: Right knee pain after a heavy metal lid struck his knee.  He was seen in the ER, x-rays of the knee were negative for acute findings.      Patient states that overall continues to feel about 80 to 85% improvement in right knee.  However he continues to get episodes of moderate pain.  These are short-lived but do not follow a specific pattern.  He has been doing physical therapy once a week she does receive good benefit from.  He states he is taking occasional ibuprofen for relief.    ROS    SOCHX: Works as a syrup supervisor  FH: No pertinent family history to this problem.       Objective:     /80   Pulse 80   Temp 36.7 °C (98.1 °F) (Temporal)   Resp 16   Ht 1.676 m (5' 6\")   Wt 90.3 kg (199 lb)   SpO2 100%   BMI 32.12 kg/m²     Constitutional: Patient is in no acute distress. Appears well-developed and well-nourished.   Cardiovascular: Normal rate.    Pulmonary/Chest: Effort normal. No respiratory distress.   Neurological: Patient is alert and oriented to person, place, and time.   Skin: Skin is warm and dry.   Psychiatric: Normal mood and affect. Behavior is normal.     Right knee: No gross deformity.  Full range of motion.  Normal gait.     MRI Knee: Full-thickness chondral fissure involving the medial facet of the patella with subchondral marrow edema. Otherwise, essentially unremarkable right knee MRI. Minimal medial compartment chondrosis. Mild anterior knee soft tissue swelling. No osseous contusion or fracture.    Assessment/Plan:       1. Contusion of right knee, subsequent encounter  - meloxicam (MOBIC) 15 MG tablet; Take 1 Tablet by mouth every day.  Dispense: 30 Tablet; Refill: 0    Released to Full Duty FROM 6/1/2023 TO 6/29/2023       Continue physical therapy  Prescribe meloxicam 15 mg once daily  Okay to " use heat or ice as needed  Full duty  Follow-up 3 weeks    Differential diagnosis, natural history, supportive care, and indications for immediate follow-up discussed.    Approximately 25 minutes was spent in preparing for visit, obtaining history, exam and evaluation, patient counseling/education and post visit documentation/orders.

## 2023-06-29 ENCOUNTER — OCCUPATIONAL MEDICINE (OUTPATIENT)
Dept: OCCUPATIONAL MEDICINE | Facility: CLINIC | Age: 43
End: 2023-06-29
Payer: COMMERCIAL

## 2023-06-29 VITALS
OXYGEN SATURATION: 99 % | HEIGHT: 66 IN | DIASTOLIC BLOOD PRESSURE: 72 MMHG | SYSTOLIC BLOOD PRESSURE: 128 MMHG | RESPIRATION RATE: 16 BRPM | HEART RATE: 84 BPM | TEMPERATURE: 98.6 F | WEIGHT: 199 LBS | BODY MASS INDEX: 31.98 KG/M2

## 2023-06-29 DIAGNOSIS — S80.01XD CONTUSION OF RIGHT KNEE, SUBSEQUENT ENCOUNTER: ICD-10-CM

## 2023-06-29 PROCEDURE — 99213 OFFICE O/P EST LOW 20 MIN: CPT | Performed by: PREVENTIVE MEDICINE

## 2023-06-29 PROCEDURE — 3078F DIAST BP <80 MM HG: CPT | Performed by: PREVENTIVE MEDICINE

## 2023-06-29 PROCEDURE — 3074F SYST BP LT 130 MM HG: CPT | Performed by: PREVENTIVE MEDICINE

## 2023-06-29 RX ORDER — DICLOFENAC SODIUM 75 MG/1
75 TABLET, DELAYED RELEASE ORAL 2 TIMES DAILY
Qty: 60 TABLET | Refills: 0 | Status: SHIPPED | OUTPATIENT
Start: 2023-06-29 | End: 2023-08-07

## 2023-06-29 ASSESSMENT — FIBROSIS 4 INDEX: FIB4 SCORE: 0.66

## 2023-06-29 NOTE — LETTER
[address],   [address] [place] [address]  Phone:  [phone] - Fax:  [phone]   Occupational Health [facility] Progress Report and Disability Certification  Date of Service: 6/29/2023   No Show:  No  Date / Time of Next Visit: 7/27/2023 @ 3:00 pm   Claim Information   Patient Name: Quinn Rollins  Claim Number:     Employer:   [employer] Date of Injury: 2/7/2023     Insurer / TPA: Roshan Blake  ID / SSN:     Occupation: Syrup Supervisor  Diagnosis: The encounter diagnosis was Contusion of right knee, subsequent encounter.    Medical Information   Related to Industrial Injury? 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  LESLIE: Right knee pain after a heavy metal lid struck his knee.  He was seen in the ER, x-rays of the knee were negative for acute findings.       6/1/2023: Patient states that overall continues to feel about 80 to 85% improvement in right knee.  However he continues to get episodes of moderate pain.  These are short-lived but do not follow a specific pattern.  He has been doing physical therapy once a week she does receive good benefit from.  He states he is taking occasional ibuprofen for relief.    6/29/2023: Patient states that in the last 2 weeks he has had significant flareup in symptoms.  He states that the physical therapy and asked to do the figure 4 while he is doing that he noted sudden pain in the right knee.  The lateral aspect.  Where he was having before.  Patient states that it continues to be flared up since then.  Has had difficulty squatting and doing other activities.  He states he is able to walk normally.  He states he tried taking meloxicam but developed headaches afterwards and so discontinued the medication.   Objective Findings: Right knee: No gross deformity.  Full range of motion.  Normal gait.     MRI Knee: Full-thickness chondral fissure involving  the medial facet of the patella with subchondral marrow edema. Otherwise, essentially unremarkable right knee MRI. Minimal medial compartment chondrosis. Mild anterior knee soft tissue swelling. No osseous contusion or fracture.   Pre-Existing Condition(s):     Assessment:   Condition Same    Status: Additional Care Required  Permanent Disability:No    Plan:      Diagnostics:      Comments:  Given duration of symptoms and exacerbation of symptoms will refer to orthopedics  Continue physical therapy  Prescribed diclofenac 75 mg twice daily  Okay to use heat or ice as needed  Full duty  Follow-up 4 weeks    Disability Information   Status: Released to Full Duty    From:  6/29/2023  Through: 7/27/2023 Restrictions are:     Physical Restrictions   Sitting:    Standing:    Stooping:    Bending:      Squatting:    Walking:    Climbing:    Pushing:      Pulling:    Other:    Reaching Above Shoulder (L):   Reaching Above Shoulder (R):       Reaching Below Shoulder (L):    Reaching Below Shoulder (R):      Not to exceed Weight Limits   Carrying(hrs):   Weight Limit(lb):   Lifting(hrs):   Weight  Limit(lb):     Comments:      Repetitive Actions   Hands: i.e. Fine Manipulations from Grasping:     Feet: i.e. Operating Foot Controls:     Driving / Operate Machinery:     Health Care Provider’s Original or Electronic Signature  Vahid Becker D.O. Health Care Provider’s Original or Electronic Signature    Vahid Becker DO MPH     Clinic Name / Location: [place] [address]  [place] [address] Clinic Phone Number: Dept: [phone]   Appointment Time: 3:00 Pm Visit Start Time: 3:05 PM   Check-In Time:  2:53 Pm Visit Discharge Time:  3:29 PM   Original-Treating Physician or Chiropractor    Page 2-Insurer/TPA    Page 3-Employer    Page 4-Employee

## 2023-06-29 NOTE — PROGRESS NOTES
"Subjective:     Quinn Rollins is a 42 y.o. 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  LESLIE: Right knee pain after a heavy metal lid struck his knee.  He was seen in the ER, x-rays of the knee were negative for acute findings.       6/1/2023: Patient states that overall continues to feel about 80 to 85% improvement in right knee.  However he continues to get episodes of moderate pain.  These are short-lived but do not follow a specific pattern.  He has been doing physical therapy once a week she does receive good benefit from.  He states he is taking occasional ibuprofen for relief.    6/29/2023: Patient states that in the last 2 weeks he has had significant flareup in symptoms.  He states that the physical therapy and asked to do the figure 4 while he is doing that he noted sudden pain in the right knee.  The lateral aspect.  Where he was having before.  Patient states that it continues to be flared up since then.  Has had difficulty squatting and doing other activities.  He states he is able to walk normally.  He states he tried taking meloxicam but developed headaches afterwards and so discontinued the medication.    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  FH: No pertinent family history to this problem.       Objective:     /72 (BP Location: Right arm, Patient Position: Sitting, BP Cuff Size: Adult long)   Pulse 84   Temp 37 °C (98.6 °F) (Temporal)   Resp 16   Ht 1.676 m (5' 6\")   Wt 90.3 kg (199 lb)   SpO2 99%   BMI 32.12 kg/m²     Constitutional: Patient is in no acute distress. Appears well-developed and well-nourished.   Cardiovascular: Normal rate.    Pulmonary/Chest: Effort normal. No respiratory distress.   Neurological: Patient is alert and oriented to person, place, and time.   Skin: Skin is warm and dry.   Psychiatric: Normal mood and affect. Behavior " is normal.     Right knee: No gross deformity.  Full range of motion.  Normal gait.     MRI Knee: Full-thickness chondral fissure involving the medial facet of the patella with subchondral marrow edema. Otherwise, essentially unremarkable right knee MRI. Minimal medial compartment chondrosis. Mild anterior knee soft tissue swelling. No osseous contusion or fracture.    Assessment/Plan:       1. Contusion of right knee, subsequent encounter  - Referral to Orthopedics  - diclofenac DR (VOLTAREN) 75 MG Tablet Delayed Response; Take 1 Tablet by mouth 2 times a day.  Dispense: 60 Tablet; Refill: 0    Released to Full Duty FROM 6/29/2023 TO 7/27/2023       Given duration of symptoms and exacerbation of symptoms will refer to orthopedics  Continue physical therapy  Prescribed diclofenac 75 mg twice daily  Okay to use heat or ice as needed  Full duty  Follow-up 4 weeks    Differential diagnosis, natural history, supportive care, and indications for immediate follow-up discussed.    Approximately 25 minutes was spent in preparing for visit, obtaining history, exam and evaluation, patient counseling/education and post visit documentation/orders.

## 2023-08-07 DIAGNOSIS — S80.01XD CONTUSION OF RIGHT KNEE, SUBSEQUENT ENCOUNTER: ICD-10-CM

## 2023-08-07 RX ORDER — DICLOFENAC SODIUM 75 MG/1
75 TABLET, DELAYED RELEASE ORAL 2 TIMES DAILY
Qty: 60 TABLET | Refills: 0 | Status: SHIPPED | OUTPATIENT
Start: 2023-08-07

## 2023-09-02 ENCOUNTER — HOSPITAL ENCOUNTER (OUTPATIENT)
Dept: LAB | Facility: MEDICAL CENTER | Age: 43
End: 2023-09-02
Attending: STUDENT IN AN ORGANIZED HEALTH CARE EDUCATION/TRAINING PROGRAM
Payer: COMMERCIAL

## 2023-09-02 LAB
BASOPHILS # BLD AUTO: 1.5 % (ref 0–1.8)
BASOPHILS # BLD: 0.1 K/UL (ref 0–0.12)
EOSINOPHIL # BLD AUTO: 0.46 K/UL (ref 0–0.51)
EOSINOPHIL NFR BLD: 7.1 % (ref 0–6.9)
ERYTHROCYTE [DISTWIDTH] IN BLOOD BY AUTOMATED COUNT: 40.2 FL (ref 35.9–50)
HCT VFR BLD AUTO: 53.4 % (ref 42–52)
HGB BLD-MCNC: 18.3 G/DL (ref 14–18)
IMM GRANULOCYTES # BLD AUTO: 0.02 K/UL (ref 0–0.11)
IMM GRANULOCYTES NFR BLD AUTO: 0.3 % (ref 0–0.9)
LYMPHOCYTES # BLD AUTO: 2.22 K/UL (ref 1–4.8)
LYMPHOCYTES NFR BLD: 34.1 % (ref 22–41)
MCH RBC QN AUTO: 31.3 PG (ref 27–33)
MCHC RBC AUTO-ENTMCNC: 34.3 G/DL (ref 32.3–36.5)
MCV RBC AUTO: 91.3 FL (ref 81.4–97.8)
MONOCYTES # BLD AUTO: 0.43 K/UL (ref 0–0.85)
MONOCYTES NFR BLD AUTO: 6.6 % (ref 0–13.4)
NEUTROPHILS # BLD AUTO: 3.28 K/UL (ref 1.82–7.42)
NEUTROPHILS NFR BLD: 50.4 % (ref 44–72)
NRBC # BLD AUTO: 0 K/UL
NRBC BLD-RTO: 0 /100 WBC (ref 0–0.2)
PLATELET # BLD AUTO: 252 K/UL (ref 164–446)
PMV BLD AUTO: 9.7 FL (ref 9–12.9)
PSA SERPL-MCNC: 0.7 NG/ML (ref 0–4)
RBC # BLD AUTO: 5.85 M/UL (ref 4.7–6.1)
WBC # BLD AUTO: 6.5 K/UL (ref 4.8–10.8)

## 2023-09-02 PROCEDURE — 36415 COLL VENOUS BLD VENIPUNCTURE: CPT

## 2023-09-02 PROCEDURE — 84403 ASSAY OF TOTAL TESTOSTERONE: CPT

## 2023-09-02 PROCEDURE — 84153 ASSAY OF PSA TOTAL: CPT

## 2023-09-02 PROCEDURE — 85025 COMPLETE CBC W/AUTO DIFF WBC: CPT

## 2023-09-02 PROCEDURE — 84270 ASSAY OF SEX HORMONE GLOBUL: CPT

## 2023-09-02 PROCEDURE — 84402 ASSAY OF FREE TESTOSTERONE: CPT

## 2023-09-05 LAB
SHBG SERPL-SCNC: 30 NMOL/L (ref 17–56)
TESTOST FREE MFR SERPL: 1.8 % (ref 1.6–2.9)
TESTOST FREE SERPL-MCNC: 31 PG/ML (ref 47–244)
TESTOST SERPL-MCNC: 171 NG/DL (ref 300–890)

## 2023-12-06 ENCOUNTER — HOSPITAL ENCOUNTER (OUTPATIENT)
Dept: LAB | Facility: MEDICAL CENTER | Age: 43
End: 2023-12-06
Attending: STUDENT IN AN ORGANIZED HEALTH CARE EDUCATION/TRAINING PROGRAM
Payer: COMMERCIAL

## 2023-12-06 LAB
BASOPHILS # BLD AUTO: 1.2 % (ref 0–1.8)
BASOPHILS # BLD: 0.08 K/UL (ref 0–0.12)
EOSINOPHIL # BLD AUTO: 0.64 K/UL (ref 0–0.51)
EOSINOPHIL NFR BLD: 9.8 % (ref 0–6.9)
ERYTHROCYTE [DISTWIDTH] IN BLOOD BY AUTOMATED COUNT: 41 FL (ref 35.9–50)
HCT VFR BLD AUTO: 52.6 % (ref 42–52)
HGB BLD-MCNC: 18.4 G/DL (ref 14–18)
IMM GRANULOCYTES # BLD AUTO: 0.02 K/UL (ref 0–0.11)
IMM GRANULOCYTES NFR BLD AUTO: 0.3 % (ref 0–0.9)
LYMPHOCYTES # BLD AUTO: 2.03 K/UL (ref 1–4.8)
LYMPHOCYTES NFR BLD: 31.1 % (ref 22–41)
MCH RBC QN AUTO: 31.8 PG (ref 27–33)
MCHC RBC AUTO-ENTMCNC: 35 G/DL (ref 32.3–36.5)
MCV RBC AUTO: 90.8 FL (ref 81.4–97.8)
MONOCYTES # BLD AUTO: 0.52 K/UL (ref 0–0.85)
MONOCYTES NFR BLD AUTO: 8 % (ref 0–13.4)
NEUTROPHILS # BLD AUTO: 3.24 K/UL (ref 1.82–7.42)
NEUTROPHILS NFR BLD: 49.6 % (ref 44–72)
NRBC # BLD AUTO: 0 K/UL
NRBC BLD-RTO: 0 /100 WBC (ref 0–0.2)
PLATELET # BLD AUTO: 339 K/UL (ref 164–446)
PMV BLD AUTO: 9.3 FL (ref 9–12.9)
RBC # BLD AUTO: 5.79 M/UL (ref 4.7–6.1)
TESTOST SERPL-MCNC: >1500 NG/DL (ref 175–781)
WBC # BLD AUTO: 6.5 K/UL (ref 4.8–10.8)

## 2023-12-06 PROCEDURE — 85025 COMPLETE CBC W/AUTO DIFF WBC: CPT

## 2023-12-06 PROCEDURE — 36415 COLL VENOUS BLD VENIPUNCTURE: CPT

## 2023-12-06 PROCEDURE — 84403 ASSAY OF TOTAL TESTOSTERONE: CPT

## 2024-01-17 ENCOUNTER — HOSPITAL ENCOUNTER (OUTPATIENT)
Dept: LAB | Facility: MEDICAL CENTER | Age: 44
End: 2024-01-17
Attending: STUDENT IN AN ORGANIZED HEALTH CARE EDUCATION/TRAINING PROGRAM
Payer: COMMERCIAL

## 2024-01-17 LAB
BASOPHILS # BLD AUTO: 1.6 % (ref 0–1.8)
BASOPHILS # BLD: 0.08 K/UL (ref 0–0.12)
EOSINOPHIL # BLD AUTO: 0.44 K/UL (ref 0–0.51)
EOSINOPHIL NFR BLD: 8.6 % (ref 0–6.9)
ERYTHROCYTE [DISTWIDTH] IN BLOOD BY AUTOMATED COUNT: 40.5 FL (ref 35.9–50)
HCT VFR BLD AUTO: 51.8 % (ref 42–52)
HGB BLD-MCNC: 18.1 G/DL (ref 14–18)
IMM GRANULOCYTES # BLD AUTO: 0.01 K/UL (ref 0–0.11)
IMM GRANULOCYTES NFR BLD AUTO: 0.2 % (ref 0–0.9)
LYMPHOCYTES # BLD AUTO: 1.89 K/UL (ref 1–4.8)
LYMPHOCYTES NFR BLD: 37 % (ref 22–41)
MCH RBC QN AUTO: 31.7 PG (ref 27–33)
MCHC RBC AUTO-ENTMCNC: 34.9 G/DL (ref 32.3–36.5)
MCV RBC AUTO: 90.7 FL (ref 81.4–97.8)
MONOCYTES # BLD AUTO: 0.4 K/UL (ref 0–0.85)
MONOCYTES NFR BLD AUTO: 7.8 % (ref 0–13.4)
NEUTROPHILS # BLD AUTO: 2.29 K/UL (ref 1.82–7.42)
NEUTROPHILS NFR BLD: 44.8 % (ref 44–72)
NRBC # BLD AUTO: 0 K/UL
NRBC BLD-RTO: 0 /100 WBC (ref 0–0.2)
PLATELET # BLD AUTO: 310 K/UL (ref 164–446)
PMV BLD AUTO: 9.6 FL (ref 9–12.9)
RBC # BLD AUTO: 5.71 M/UL (ref 4.7–6.1)
TESTOST SERPL-MCNC: 508 NG/DL (ref 175–781)
WBC # BLD AUTO: 5.1 K/UL (ref 4.8–10.8)

## 2024-01-17 PROCEDURE — 36415 COLL VENOUS BLD VENIPUNCTURE: CPT

## 2024-01-17 PROCEDURE — 84403 ASSAY OF TOTAL TESTOSTERONE: CPT

## 2024-01-17 PROCEDURE — 85025 COMPLETE CBC W/AUTO DIFF WBC: CPT

## 2024-04-25 ENCOUNTER — HOSPITAL ENCOUNTER (EMERGENCY)
Facility: MEDICAL CENTER | Age: 44
End: 2024-04-25
Attending: EMERGENCY MEDICINE
Payer: COMMERCIAL

## 2024-04-25 VITALS
RESPIRATION RATE: 16 BRPM | HEART RATE: 76 BPM | SYSTOLIC BLOOD PRESSURE: 126 MMHG | OXYGEN SATURATION: 97 % | DIASTOLIC BLOOD PRESSURE: 87 MMHG | BODY MASS INDEX: 31.82 KG/M2 | HEIGHT: 66 IN | WEIGHT: 197.97 LBS | TEMPERATURE: 98 F

## 2024-04-25 DIAGNOSIS — S09.90XA CLOSED HEAD INJURY, INITIAL ENCOUNTER: ICD-10-CM

## 2024-04-25 PROCEDURE — 99282 EMERGENCY DEPT VISIT SF MDM: CPT

## 2024-04-25 PROCEDURE — A9270 NON-COVERED ITEM OR SERVICE: HCPCS

## 2024-04-25 PROCEDURE — 700102 HCHG RX REV CODE 250 W/ 637 OVERRIDE(OP)

## 2024-04-25 RX ORDER — ACETAMINOPHEN 325 MG/1
650 TABLET ORAL ONCE
Status: COMPLETED | OUTPATIENT
Start: 2024-04-25 | End: 2024-04-25

## 2024-04-25 RX ADMIN — ACETAMINOPHEN 650 MG: 325 TABLET, FILM COATED ORAL at 13:23

## 2024-04-25 ASSESSMENT — FIBROSIS 4 INDEX: FIB4 SCORE: 0.6

## 2024-04-25 NOTE — ED PROVIDER NOTES
"ED Provider Note    CHIEF COMPLAINT  Chief Complaint   Patient presents with    Head Injury     Pt states hit in head at work by lid on a compartment.  Denies LOC       EXTERNAL RECORDS REVIEWED  Outpatient Notes the patient was seen at occupational health on 2023 for a right knee injury    HPI/ROS  LIMITATION TO HISTORY   Select: : None  OUTSIDE HISTORIAN(S):  None    Quinn Rollins is a 43 y.o. male who presents with no significant past medical history, he was at work and he was hit in the head by the lid of a large container.  He had no loss of consciousness.  It hit him in the back of the head.  He felt pain and dizziness which now has resolved.  He had no vomiting, no focal neurologic deficits.    PAST MEDICAL HISTORY   has a past medical history of History of COVID-19 ().    SURGICAL HISTORY  patient denies any surgical history    FAMILY HISTORY  Family History   Problem Relation Age of Onset    Cancer Brother         brain tumor       SOCIAL HISTORY  Social History     Tobacco Use    Smoking status: Every Day     Current packs/day: 0.00     Average packs/day: 0.5 packs/day for 24.0 years (12.0 ttl pk-yrs)     Types: Cigarettes     Start date: 10/11/1998     Last attempt to quit: 2022     Years since quittin.9    Smokeless tobacco: Never   Vaping Use    Vaping Use: Every day    Start date: 2022    Substances: Nicotine   Substance and Sexual Activity    Alcohol use: Not Currently     Comment: quit     Drug use: No    Sexual activity: Not Currently     Comment:        CURRENT MEDICATIONS  The patient denies      ALLERGIES  No Known Allergies    PHYSICAL EXAM  VITAL SIGNS: /84   Pulse 76   Temp 36.7 °C (98 °F) (Temporal)   Resp 16   Ht 1.676 m (5' 6\")   Wt 89.8 kg (197 lb 15.6 oz)   SpO2 97%   BMI 31.95 kg/m²      Constitutional: Alert.  HENT: No signs of trauma, Bilateral external ears normal, Nose normal.   Eyes: Pupils are equal and reactive, Conjunctiva " normal, Non-icteric.   Neck: Normal range of motion, No tenderness, Supple, No stridor.   Neurologic: Alert , Normal motor function, Normal sensory function, No focal deficits noted.   Psychiatric: Affect normal, Judgment normal, Mood normal.             COURSE & MEDICAL DECISION MAKING    ASSESSMENT, COURSE AND PLAN  Care Narrative:     The patient presents status post head injury.  At this time CT scan is not indicated.  He is given instructions to be observed for 6 hours after the injury.  He will return for any worsening symptoms.            ADDITIONAL PROBLEMS MANAGED  Head injury    DISPOSITION AND DISCUSSIONS  I have discussed management of the patient with the following physicians and CLARA's: None    Discussion of management with other [facility] or appropriate source(s): None     Escalation of care considered, and ultimately not performed:diagnostic imaging    Barriers to care at this time, including but not limited to:  None .     Decision tools and prescription drugs considered including, but not limited to:  Discussed pain medication but he will take over-the-counter instead of prescription .  Filled out C4 form, this is Workmen's Comp.    The patient will return for new or worsening symptoms and is stable at the time of discharge.    The patient is referred to a primary physician for blood pressure management, diabetic screening, and for all other preventative health concerns.        DISPOSITION:  Patient will be discharged home in stable condition.    FOLLOW UP:  [facility], Emergency Dept  [address]  [place] [address] [phone]    If symptoms worsen    [facility] [address]  [place] [address]  [phone]    As needed      OUTPATIENT MEDICATIONS:  New Prescriptions    No medications on file         FINAL DIAGNOSIS  1. Closed head injury, initial encounter           Electronically signed by: Brandon Keita M.D., 4/25/2024 12:50 PM

## 2024-04-25 NOTE — ED TRIAGE NOTES
Chief Complaint   Patient presents with    Head Injury     Pt states hit in head at work by lid on a compartment.  Denies LOC

## 2024-04-25 NOTE — LETTER
"  FORM C-4:  EMPLOYEE’S CLAIM FOR COMPENSATION/ REPORT OF INITIAL TREATMENT  EMPLOYEE’S CLAIM - PROVIDE ALL INFORMATION REQUESTED   First Name Quinn Last Name Ayo Birthdate 1980  Sex male Claim Number   Home Address [address]             [place] [address]                                   Age  43 y.o. 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 (if applicable) ADDING POWDER TO THE VESSEL    How did this injury or occupational disease occur? Be specific and answer in detail. Use additional sheet if necessary)  I OPEN THE LID OF THE VESSEL, THEN I TURN TO GRAB A UTENSIL THE LID WENT BACK DOWN ADN HIT ME AT THE BACK OF MY HEAD.   If you believe that you have an occupational disease, when did you first have knowledge of the disability and it relationship to your employment? n/a Witnesses to the Accident  HIOO LATT   Nature of Injury or Occupational Disease  Workers' Compensation Part(s) of Body Injured or Affected  Skull, N/A, N/A    I CERTIFY THAT THE ABOVE IS TRUE AND CORRECT TO THE BEST OF MY KNOWLEDGE AND THAT I HAVE PROVIDED THIS INFORMATION IN ORDER TO OBTAIN THE BENEFITS OF [place]).  I HEREBY AUTHORIZE ANY PHYSICIAN, CHIROPRACTOR, SURGEON, PRACTITIONER, OR OTHER PERSON, ANY HOSPITAL, INCLUDING [facility] OR [facility], ANY MEDICAL SERVICE ORGANIZATION, ANY INSURANCE COMPANY, OR OTHER INSTITUTION OR ORGANIZATION TO RELEASE TO EACH OTHER, ANY MEDICAL OR OTHER INFORMATION, INCLUDING BENEFITS PAID OR PAYABLE, PERTINENT TO THIS INJURY OR DISEASE, EXCEPT INFORMATION RELATIVE TO DIAGNOSIS, TREATMENT AND/OR COUNSELING FOR AIDS, PSYCHOLOGICAL CONDITIONS, ALCOHOL OR CONTROLLED SUBSTANCES, FOR WHICH I MUST GIVE SPECIFIC AUTHORIZATION.  A PHOTOSTAT OF THIS AUTHORIZATION SHALL BE AS VALID AS THE ORIGINAL.  Date    04/25/2024    Place   [facility]         Employee’s Signature   THIS REPORT MUST BE COMPLETED AND MAILED WITHIN 3 WORKING DAYS OF TREATMENT   Place [facility], EMERGENCY DEPT                       Name of Facility [facility]   Date  4/25/2024 Diagnosis  (S09.90XA) Closed head injury, initial encounter Is there evidence the injured employee was under the influence of alcohol and/or another controlled substance at the time of accident?   Hour  1:09 PM Description of Injury or Disease  Closed head injury, initial encounter No   Treatment  Tylenol/Motrin  Have you advised the patient to remain off work five days or more?         No   X-Ray Findings    If Yes   From Date    To Date      From information given by the employee, together with medical evidence, can you directly connect this injury or occupational disease as job incurred? Yes If No, is employee capable of: Full Duty  Yes Modified Duty      Is additional medical care by a physician indicated? Yes If Modified Duty, Specify any Limitations / Restrictions       Do you know of any previous injury or disease contributing to this condition or occupational disease? No    Date 4/25/2024 Print Doctor’s Name Brandon Keita I certify the employer’s copy  "of this form was mailed on:   Address [address] [place] [address]  [place] [id number] [phone] INSURER’S USE ONLY   Provider’s Tax ID Number [id number] Telephone Dept: [phone]    Doctor’s Signature e-SignDE MARILEE THOMPSON M.D., MD      Form C-4 (rev.10/07)                                                                         BRIEF DESCRIPTION OF RIGHTS AND BENEFITS  (Pursuant to [place] 616[place].050)    Notice of Injury or Occupational Disease (Incident Report Form C-1): If an injury or occupational disease (OD) arises out of and in the course of employment, you must provide written notice to your employer as soon as practicable, but no later than 7 days after the accident or OD. Your employer shall maintain a sufficient supply of the required forms.    Claim for Compensation (Form C-4): If medical treatment is sought, the form C-4 is available at the place of initial treatment. A completed \"Claim for Compensation\" (Form C-4) must be filed within 90 days after an accident or OD. The treating physician or chiropractor must, within 3 working days after treatment, complete and mail to the employer, the employer's insurer and third-party , the Claim for Compensation.    Medical Treatment: If you require medical treatment for your on-the-job injury or OD, you may be required to select a physician or chiropractor from a list provided by your workers’ compensation insurer, if it has contracted with an Organization for Managed Care (MCO) or Preferred Provider Organization (PPO) or providers of health care. If your employer has not entered into a contract with an MCO or PPO, you may select a physician or chiropractor from the Panel of Physicians and Chiropractors. Any medical costs related to your industrial injury or OD will be paid by your insurer.    Temporary Total Disability (TTD): If your doctor has certified that you are unable to work for a period of at least 5 consecutive days, or 5 cumulative days in a " 20-day period, or places restrictions on you that your employer does not accommodate, you may be entitled to TTD compensation.    Temporary Partial Disability (TPD): If the wage you receive upon reemployment is less than the compensation for TTD to which you are entitled, the insurer may be required to pay you TPD compensation to make up the difference. TPD can only be paid for a maximum of 24 months.    Permanent Partial Disability (PPD): When your medical condition is stable and there is an indication of a PPD as a result of your injury or OD, within 30 days, your insurer must arrange for an evaluation by a rating physician or chiropractor to determine the degree of your PPD. The amount of your PPD award depends on the date of injury, the results of the PPD evaluation, your age and wage.    Permanent Total Disability (PTD): If you are medically certified by a treating physician or chiropractor as permanently and totally disabled and have been granted a PTD status by your insurer, you are entitled to receive monthly benefits not to exceed 66 2/3% of your average monthly wage. The amount of your PTD payments is subject to reduction if you previously received a lump-sum PPD award.    Vocational Rehabilitation Services: You may be eligible for vocational rehabilitation services if you are unable to return to the job due to a permanent physical impairment or permanent restrictions as a result of your injury or occupational disease.    Transportation and Per Fany Reimbursement: You may be eligible for travel expenses and per fany associated with medical treatment.    Reopening: You may be able to reopen your claim if your condition worsens after claim closure.     Appeal Process: If you disagree with a written determination issued by the insurer or the insurer does not respond to your request, you may appeal to the Department of Administration, , by following the instructions contained in your  determination letter. You must appeal the determination within 70 days from the date of the determination letter at [address], [address], [place], [place] [address], or [address], [address], [place], [place] [address]. If you disagree with the  decision, you may appeal to the Department of Administration, . You must file your appeal within 30 days from the date of the  decision letter at [address], [address], [place], [place] [address], or [address], [address], [place], [place] [address]. If you disagree with a decision of an , you may file a petition for judicial review with the District Court. You must do so within 30 days of the Appeal Officer’s decision. You may be represented by an  at your own expense or you may contact the [facility] for possible representation.    [place]  for Injured Workers (NAIW): If you disagree with a  decision, you may request that NAIW represent you without charge at an  Hearing. For information regarding denial of benefits, you may contact the [facility] at: [address], [address], [place] [address], [phone], or [address], [address], [place] [address], [phone]    To File a Complaint with the Division: If you wish to file a complaint with the  of the Division of Industrial Relations (DIR),  please contact the Workers’ Compensation Section, [address], [address], [place], [place] [address], telephone [phone]) [phone], or [address], [address], [place], [place] [address], telephone [phone]) [phone].    For assistance with Workers’ Compensation Issues: You may contact the [facility] Office for Consumer Health Assistance, [address], [address], [place], [place] [address], Toll Free 1-144.966.4876, Web site: http://[facility].[place].gov/Programs/CALI E-mail: cali@[facility].[place].gov  D-2 (rev.  10/20)              __________________________________________________________________                                    ___04/25/2024___            Employee Name / Signature                                                                                                                            Date

## 2024-04-25 NOTE — ED NOTES
Pt ambulated to the room with steady gait and without assistance. Agree with triage note. Pt vitals taken. No further complaints at this time. Chart up for ERP.

## 2024-04-29 ENCOUNTER — HOSPITAL ENCOUNTER (EMERGENCY)
Facility: MEDICAL CENTER | Age: 44
End: 2024-04-29
Attending: STUDENT IN AN ORGANIZED HEALTH CARE EDUCATION/TRAINING PROGRAM
Payer: COMMERCIAL

## 2024-04-29 VITALS
OXYGEN SATURATION: 97 % | HEART RATE: 64 BPM | WEIGHT: 195.99 LBS | DIASTOLIC BLOOD PRESSURE: 89 MMHG | TEMPERATURE: 97.2 F | HEIGHT: 66 IN | RESPIRATION RATE: 18 BRPM | BODY MASS INDEX: 31.5 KG/M2 | SYSTOLIC BLOOD PRESSURE: 144 MMHG

## 2024-04-29 DIAGNOSIS — S09.90XA CLOSED HEAD INJURY, INITIAL ENCOUNTER: ICD-10-CM

## 2024-04-29 PROCEDURE — 99282 EMERGENCY DEPT VISIT SF MDM: CPT

## 2024-04-29 ASSESSMENT — FIBROSIS 4 INDEX: FIB4 SCORE: 0.6

## 2024-04-29 NOTE — LETTER
[facility], EMERGENCY DEPT   [address] [place], [place] [address]  Phone: Dept: [phone] - Fax:        [facility] Progress Report and Disability Certification  Date of Service: 4/29/2024   No Show:  No  Date / Time of Next Visit:  04/29/2024   Claim Information   Patient Name: Quinn Rollins  Claim Number:     Employer:   [employer] Date of Injury:   04/25/2024   Insurer / TPA: Elva ID / SSN: [id number]    Occupation: COMPOUNDING  Diagnosis: The encounter diagnosis was Closed head injury, initial encounter.    Medical Information   Related to Industrial Injury? Yes    Subjective Complaints:  Posterior head pain   Objective Findings: Mild tenderness to palpation to right occiput, no evidence of basilar skull fracture, normal neurologic exam    Pre-Existing Condition(s): none   Assessment:   Initial Visit    Status: Discharged / MMI  Permanent Disability:No    Plan:      Diagnostics:      Comments:       Disability Information   Status: Released to Full Duty    From:     Through:   Restrictions are:     Physical Restrictions   Sitting:    Standing:    Stooping:    Bending:      Squatting:    Walking:    Climbing:    Pushing:      Pulling:    Other:    Reaching Above Shoulder (L):   Reaching Above Shoulder (R):       Reaching Below Shoulder (L):    Reaching Below Shoulder (R):      Not to exceed Weight Limits   Carrying(hrs):   Weight Limit(lb):   Lifting(hrs):   Weight  Limit(lb):     Comments:      Repetitive Actions   Hands: i.e. Fine Manipulations from Grasping:     Feet: i.e. Operating Foot Controls:     Driving / Operate Machinery:     Physician Name: Ruel Lombardi Physician Signature: RUEL Foster M.D. e-Signature:  , Medical Director   Clinic Name / Location: [facility], EMERGENCY DEPT  [address] [id number] [phone]     Clinic Phone Number:  Dept: [phone]   Appointment Time:  Visit Start Time:    Check-In Time:  5:56 PM Visit Discharge Time:    Original-Treating Physician or Chiropractor    Page 2-Insurer/TPA    Page 3-Employer    Page 4-Employee

## 2024-04-29 NOTE — LETTER
[facility], EMERGENCY DEPT   [address] [place], [place] [address]  Phone: Dept: [phone] - Fax:        [facility] Progress Report and Disability Certification  Date of Service: 4/29/2024   No Show:  No  Date / Time of Next Visit:     Claim Information   Patient Name: Quinn Rollins  Claim Number:     Employer:    Date of Injury:      Insurer / TPA: Elva ID / SSN:    Occupation:  Diagnosis: The encounter diagnosis was Closed head injury, initial encounter.    Medical Information   Related to Industrial Injury?      Subjective Complaints:      Objective Findings:     Pre-Existing Condition(s):     Assessment:        Status:    Permanent Disability:     Plan:      Diagnostics:      Comments:       Disability Information   Status:      From:     Through:   Restrictions are:     Physical Restrictions   Sitting:    Standing:    Stooping:    Bending:      Squatting:    Walking:    Climbing:    Pushing:      Pulling:    Other:    Reaching Above Shoulder (L):   Reaching Above Shoulder (R):       Reaching Below Shoulder (L):    Reaching Below Shoulder (R):      Not to exceed Weight Limits   Carrying(hrs):   Weight Limit(lb):   Lifting(hrs):   Weight  Limit(lb):     Comments:      Repetitive Actions   Hands: i.e. Fine Manipulations from Grasping:     Feet: i.e. Operating Foot Controls:     Driving / Operate Machinery:     Physician Name: Marion Lombardi Physician Signature:   e-Signature:  , Medical Director   Clinic Name / Location: [facility], EMERGENCY DEPT  [address] [place] [id number] [phone]     Clinic Phone Number: Dept: [phone]   Appointment Time:  Visit Start Time:    Check-In Time:  5:56 PM Visit Discharge Time:    Original-Treating Physician or Chiropractor    Page 2-Insurer/TPA    Page 3-Employer    Page 4-Employee

## 2024-04-30 NOTE — DISCHARGE INSTRUCTIONS
You were seen for evaluation of head injury.  There is no indication for CT scan at this time.  Please take Tylenol and Motrin needed for pain.  Return if you feel your symptoms are worsening.

## 2024-04-30 NOTE — ED TRIAGE NOTES
Chief Complaint   Patient presents with    Head Injury     Patient was seen on 4/25 for a work injury. Patient returns today for a follow up and requests a second opinion. Patient denies headache, dizziness or any other symptoms at this time. Patient reprots right side of his head hurts if he pushes on it.

## 2024-04-30 NOTE — ED PROVIDER NOTES
ED Provider Note    CHIEF COMPLAINT  Chief Complaint   Patient presents with    Head Injury     Patient was seen on  for a work injury. Patient returns today for a follow up and requests a second opinion. Patient denies headache, dizziness or any other symptoms at this time. Patient reprots right side of his head hurts if he pushes on it.        EXTERNAL RECORDS REVIEWED  Other Patient seen  for work injury and at that time they had discussed observation and no indication for CT head    HPI/ROS  LIMITATION TO HISTORY   Select: : None  OUTSIDE HISTORIAN(S):  None    Quinn Rollins is a 43 y.o. male who presents for evaluation of a head injury.  He was at work on  when the lid of a large container hit him in the back of the head.  He did not lose consciousness.  He did not have any nausea or vomiting.  He has no headache.  He was seen in the emergency room and was observed but no CT scan was done.  He returns today to make sure that he does not need a CT scan.  He has no headache, dizziness, visual changes, nausea, vomiting, numbness, tingling, weakness.  He is not on any blood thinners.  He has no chronic medical problems.    PAST MEDICAL HISTORY   has a past medical history of History of COVID-19 ().    SURGICAL HISTORY  patient denies any surgical history    FAMILY HISTORY  Family History   Problem Relation Age of Onset    Cancer Brother         brain tumor       SOCIAL HISTORY  Social History     Tobacco Use    Smoking status: Some Days     Current packs/day: 0.00     Average packs/day: 0.5 packs/day for 24.0 years (12.0 ttl pk-yrs)     Types: Cigarettes     Start date: 10/11/1998     Last attempt to quit: 2022     Years since quittin.9    Smokeless tobacco: Never   Vaping Use    Vaping Use: Every day    Start date: 2022    Substances: Nicotine, Flavoring   Substance and Sexual Activity    Alcohol use: Not Currently     Comment: quit 2018    Drug use: No    Sexual activity:  "Not Currently     Comment:        CURRENT MEDICATIONS  Home Medications       Reviewed by Delmy Aguayo R.N. (Registered Nurse) on 04/29/24 at 1820  Med List Status: Partial     Medication Last Dose Status   acetaminophen (TYLENOL) 325 MG Tab  Active   cetirizine (ZYRTEC) 10 MG Tab  Active   diclofenac DR (VOLTAREN) 75 MG Tablet Delayed Response  Active   fluticasone (FLONASE) 50 MCG/ACT nasal spray  Active   methylPREDNISolone (MEDROL DOSEPAK) 4 MG Tablet Therapy Pack  Active   olopatadine (PATANOL) 0.1 % ophthalmic solution  Active   testosterone cypionate (DEPO-TESTOSTERONE) 200 MG/ML Solution injection  Active                    ALLERGIES  No Known Allergies    PHYSICAL EXAM  VITAL SIGNS: /84   Pulse 82   Temp 36.3 °C (97.3 °F) (Temporal)   Resp 16   Ht 1.676 m (5' 6\")   Wt 88.9 kg (195 lb 15.8 oz)   SpO2 98%   BMI 31.63 kg/m²      Constitutional: Well developed, Well nourished, No acute respiratory distress, Non-toxic appearance.   HENT: Normocephalic, Slight tenderness to palpation to right occiput however no cephalohematoma, no bogginess, no wounds, Bilateral external ears normal, no hemotympanum, negative Jones sign oropharynx clear, mucous membranes are moist.  No nasal septal hematoma, no midface instability  Eyes: Conjunctiva normal, No discharge. No icterus.  No raccoon eyes  Neck: Normal range of motion. Supple.  Thorax & Lungs: Nonlabored respirations  Skin: Warm, Dry, No erythema, No rash.   Extremities: Intact distal pulses, No edema, No tenderness  Musculoskeletal: Good range of motion in all major joints. Normal gait.  Neurologic: Alert & oriented. No focal deficits noted.  Strength 5 out of 5 to bilateral upper and lower extremities, sensation intact to light touch, cranial nerves II through XII intact  Psych: Alert normal affect         COURSE & MEDICAL DECISION MAKING    ASSESSMENT, COURSE AND PLAN  Care Narrative:   This is a 43-year-old male who presents for evaluation " of head injury which occurred approximately 4 days ago.  He was seen in the emergency room after this injury where he was hit in the head by a large container while at work and no CT scan was done as there is no indication.  He returns today to make sure that he does not need a CT scan.  He has had absolutely no symptoms.  He denies any headache, vomiting, numbness, tingling, weakness.  He has mild tenderness over the right occiput but there is no concerning findings to suggest skull fracture.  He does not have any signs of basilar skull fracture on exam.  I did use the [place] CT head rule and per this rule this CT head for head trauma is unnecessary.  I did share this with the patient.  I did also offer to order a CT scan of the head for peace of mind however I am not sure if this is worth the radiation exposure given he has no symptoms of traumatic brain injury and his exam is reassuring and he is not on blood thinners.  Patient agrees with this and is okay with not getting a CT scan of the head.  He will follow-up with his primary care doctor.  Strict ER return precautions discussed.  Patient is agreeable discharge home no further questions.            ADDITIONAL PROBLEMS MANAGED  None    DISPOSITION AND DISCUSSIONS  I have discussed management of the patient with the following physicians and CLARA's:  None    Discussion of management with other QHP or appropriate source(s): None     Escalation of care considered, and ultimately not performed:diagnostic imaging    Barriers to care at this time, including but not limited to:  None .     Decision tools and prescription drugs considered including, but not limited to:  [place] CT head rule .    The patient will return for new or worsening symptoms and is stable at the time of discharge.    The patient is referred to a primary physician for blood pressure management, diabetic screening, and for all other preventative health concerns.    DISPOSITION:  Patient will be  discharged home in stable condition.    FOLLOW UP:  Barrington Gomes D.O.  [address]  [facility] [address]  [facility] [address]  [phone]          [facility], Emergency Dept  [address] [facility] [address]  [phone]          OUTPATIENT MEDICATIONS:  Discharge Medication List as of 4/29/2024  8:55 PM            FINAL DIAGNOSIS  1. Closed head injury, initial encounter           Electronically signed by: Marion Lombardi M.D., 4/29/2024 6:29 PM

## 2024-05-15 ENCOUNTER — OFFICE VISIT (OUTPATIENT)
Dept: URGENT CARE | Facility: CLINIC | Age: 44
End: 2024-05-15
Payer: COMMERCIAL

## 2024-05-15 VITALS
RESPIRATION RATE: 20 BRPM | OXYGEN SATURATION: 97 % | HEIGHT: 66 IN | DIASTOLIC BLOOD PRESSURE: 92 MMHG | HEART RATE: 94 BPM | TEMPERATURE: 97.5 F | WEIGHT: 197.4 LBS | BODY MASS INDEX: 31.72 KG/M2 | SYSTOLIC BLOOD PRESSURE: 144 MMHG

## 2024-05-15 DIAGNOSIS — R68.89 FLU-LIKE SYMPTOMS: ICD-10-CM

## 2024-05-15 LAB
FLUAV RNA SPEC QL NAA+PROBE: NEGATIVE
FLUBV RNA SPEC QL NAA+PROBE: NEGATIVE
RSV RNA SPEC QL NAA+PROBE: NEGATIVE
SARS-COV-2 RNA RESP QL NAA+PROBE: NEGATIVE

## 2024-05-15 PROCEDURE — 3080F DIAST BP >= 90 MM HG: CPT | Performed by: FAMILY MEDICINE

## 2024-05-15 PROCEDURE — 99213 OFFICE O/P EST LOW 20 MIN: CPT | Performed by: FAMILY MEDICINE

## 2024-05-15 PROCEDURE — 0241U POCT CEPHEID COV-2, FLU A/B, RSV - PCR: CPT | Performed by: FAMILY MEDICINE

## 2024-05-15 PROCEDURE — 3077F SYST BP >= 140 MM HG: CPT | Performed by: FAMILY MEDICINE

## 2024-05-15 ASSESSMENT — ENCOUNTER SYMPTOMS
SORE THROAT: 1
EYES NEGATIVE: 1
GASTROINTESTINAL NEGATIVE: 1
CARDIOVASCULAR NEGATIVE: 1
COUGH: 1

## 2024-05-15 ASSESSMENT — FIBROSIS 4 INDEX: FIB4 SCORE: 0.6

## 2024-05-15 NOTE — PROGRESS NOTES
"Subjective:   Quinn Rollins is a 43 y.o. male who presents for Cough (Fever/ body aches/ chills x 3 days )      Cough  Associated symptoms include a sore throat.       Review of Systems   Constitutional:  Positive for malaise/fatigue.   HENT:  Positive for congestion and sore throat.    Eyes: Negative.    Respiratory:  Positive for cough.    Cardiovascular: Negative.    Gastrointestinal: Negative.        Medications, Allergies, and current problem list reviewed today in Epic.     Objective:     BP (!) 144/92   Pulse 94   Temp 36.4 °C (97.5 °F) (Temporal)   Resp 20   Ht 1.676 m (5' 6\")   Wt 89.5 kg (197 lb 6.4 oz)   SpO2 97%     Physical Exam  Vitals and nursing note reviewed.   Constitutional:       Appearance: Normal appearance.   HENT:      Head: Normocephalic and atraumatic.      Right Ear: Tympanic membrane normal.      Left Ear: Tympanic membrane normal.      Nose: Congestion present.      Mouth/Throat:      Pharynx: Posterior oropharyngeal erythema present.   Cardiovascular:      Rate and Rhythm: Normal rate and regular rhythm.      Pulses: Normal pulses.      Heart sounds: Normal heart sounds.   Pulmonary:      Effort: Pulmonary effort is normal.      Breath sounds: Normal breath sounds.   Musculoskeletal:      Cervical back: Tenderness present.   Lymphadenopathy:      Cervical: No cervical adenopathy.   Neurological:      Mental Status: He is alert.         Assessment/Plan:     Diagnosis and associated orders:     1. Flu-like symptoms  POCT CEPHEID COV-2, FLU A/B, RSV - PCR         Comments/MDM:              Differential diagnosis, natural history, supportive care, and indications for immediate follow-up discussed.    Advised the patient to follow-up with the primary care physician for recheck, reevaluation, and consideration of further management.    Please note that this dictation was created using voice recognition software. I have made a reasonable attempt to correct obvious errors, but I " expect that there are errors of grammar and possibly content that I did not discover before finalizing the note.    This note was electronically signed by Saturnino Phan M.D.

## 2024-05-15 NOTE — LETTER
[facility] [place]  [address] [address]     May 15, 2024    Patient: Quinn Rollins   YOB: 1980   Date of Visit: 5/15/2024       To Whom It May Concern:    Quinn Rollins was seen and treated in our department on 5/15/2024. Please excuse from work this week, for flu like symptoms.    Sincerely,     Saturnino Phan M.D.

## 2024-06-10 ENCOUNTER — HOSPITAL ENCOUNTER (OUTPATIENT)
Dept: LAB | Facility: MEDICAL CENTER | Age: 44
End: 2024-06-10
Attending: STUDENT IN AN ORGANIZED HEALTH CARE EDUCATION/TRAINING PROGRAM
Payer: COMMERCIAL

## 2024-06-10 LAB — TESTOST SERPL-MCNC: 383 NG/DL (ref 175–781)

## 2024-06-10 PROCEDURE — 84403 ASSAY OF TOTAL TESTOSTERONE: CPT

## 2024-06-10 PROCEDURE — 36415 COLL VENOUS BLD VENIPUNCTURE: CPT
